# Patient Record
Sex: FEMALE | Race: WHITE | NOT HISPANIC OR LATINO | Employment: OTHER | URBAN - METROPOLITAN AREA
[De-identification: names, ages, dates, MRNs, and addresses within clinical notes are randomized per-mention and may not be internally consistent; named-entity substitution may affect disease eponyms.]

---

## 2017-02-03 ENCOUNTER — GENERIC CONVERSION - ENCOUNTER (OUTPATIENT)
Dept: OTHER | Facility: OTHER | Age: 62
End: 2017-02-03

## 2017-04-15 ENCOUNTER — TRANSCRIBE ORDERS (OUTPATIENT)
Dept: LAB | Facility: HOSPITAL | Age: 62
End: 2017-04-15

## 2017-04-15 ENCOUNTER — APPOINTMENT (OUTPATIENT)
Dept: LAB | Facility: HOSPITAL | Age: 62
End: 2017-04-15
Attending: INTERNAL MEDICINE
Payer: COMMERCIAL

## 2017-04-15 DIAGNOSIS — E78.5 OTHER AND UNSPECIFIED HYPERLIPIDEMIA: ICD-10-CM

## 2017-04-15 DIAGNOSIS — E78.5 OTHER AND UNSPECIFIED HYPERLIPIDEMIA: Primary | ICD-10-CM

## 2017-04-15 LAB
ALBUMIN SERPL BCP-MCNC: 3.6 G/DL (ref 3.5–5)
ALP SERPL-CCNC: 87 U/L (ref 46–116)
ALT SERPL W P-5'-P-CCNC: 26 U/L (ref 12–78)
ANION GAP SERPL CALCULATED.3IONS-SCNC: 9 MMOL/L (ref 4–13)
AST SERPL W P-5'-P-CCNC: 20 U/L (ref 5–45)
BASOPHILS # BLD AUTO: 0.03 THOUSANDS/ΜL (ref 0–0.1)
BASOPHILS NFR BLD AUTO: 0 % (ref 0–1)
BILIRUB SERPL-MCNC: 0.6 MG/DL (ref 0.2–1)
BUN SERPL-MCNC: 13 MG/DL (ref 5–25)
CALCIUM SERPL-MCNC: 9.1 MG/DL (ref 8.3–10.1)
CHLORIDE SERPL-SCNC: 104 MMOL/L (ref 100–108)
CHOLEST SERPL-MCNC: 252 MG/DL (ref 50–200)
CO2 SERPL-SCNC: 29 MMOL/L (ref 21–32)
CREAT SERPL-MCNC: 0.81 MG/DL (ref 0.6–1.3)
CREAT UR-MCNC: 77.6 MG/DL
CRP SERPL QL: 21.9 MG/L
EOSINOPHIL # BLD AUTO: 0.1 THOUSAND/ΜL (ref 0–0.61)
EOSINOPHIL NFR BLD AUTO: 1 % (ref 0–6)
ERYTHROCYTE [DISTWIDTH] IN BLOOD BY AUTOMATED COUNT: 13.2 % (ref 11.6–15.1)
EST. AVERAGE GLUCOSE BLD GHB EST-MCNC: 123 MG/DL
GFR SERPL CREATININE-BSD FRML MDRD: >60 ML/MIN/1.73SQ M
GLUCOSE P FAST SERPL-MCNC: 115 MG/DL (ref 65–99)
HBA1C MFR BLD: 5.9 % (ref 4.2–6.3)
HCT VFR BLD AUTO: 40.6 % (ref 34.8–46.1)
HDLC SERPL-MCNC: 69 MG/DL (ref 40–60)
HGB BLD-MCNC: 13.1 G/DL (ref 11.5–15.4)
LDLC SERPL CALC-MCNC: 163 MG/DL (ref 0–100)
LYMPHOCYTES # BLD AUTO: 2.41 THOUSANDS/ΜL (ref 0.6–4.47)
LYMPHOCYTES NFR BLD AUTO: 30 % (ref 14–44)
MCH RBC QN AUTO: 27.1 PG (ref 26.8–34.3)
MCHC RBC AUTO-ENTMCNC: 32.3 G/DL (ref 31.4–37.4)
MCV RBC AUTO: 84 FL (ref 82–98)
MICROALBUMIN UR-MCNC: <5 MG/L (ref 0–20)
MICROALBUMIN/CREAT 24H UR: <6 MG/G CREATININE (ref 0–30)
MONOCYTES # BLD AUTO: 0.44 THOUSAND/ΜL (ref 0.17–1.22)
MONOCYTES NFR BLD AUTO: 6 % (ref 4–12)
NEUTROPHILS # BLD AUTO: 4.99 THOUSANDS/ΜL (ref 1.85–7.62)
NEUTS SEG NFR BLD AUTO: 62 % (ref 43–75)
NRBC BLD AUTO-RTO: 0 /100 WBCS
PLATELET # BLD AUTO: 220 THOUSANDS/UL (ref 149–390)
PMV BLD AUTO: 11.2 FL (ref 8.9–12.7)
POTASSIUM SERPL-SCNC: 3.8 MMOL/L (ref 3.5–5.3)
PROT SERPL-MCNC: 7.2 G/DL (ref 6.4–8.2)
RBC # BLD AUTO: 4.83 MILLION/UL (ref 3.81–5.12)
SODIUM SERPL-SCNC: 142 MMOL/L (ref 136–145)
TRIGL SERPL-MCNC: 98 MG/DL
URATE SERPL-MCNC: 4.7 MG/DL (ref 2–6.8)
WBC # BLD AUTO: 8 THOUSAND/UL (ref 4.31–10.16)

## 2017-04-15 PROCEDURE — 85025 COMPLETE CBC W/AUTO DIFF WBC: CPT

## 2017-04-15 PROCEDURE — 36415 COLL VENOUS BLD VENIPUNCTURE: CPT

## 2017-04-15 PROCEDURE — 86140 C-REACTIVE PROTEIN: CPT

## 2017-04-15 PROCEDURE — 84550 ASSAY OF BLOOD/URIC ACID: CPT

## 2017-04-15 PROCEDURE — 82570 ASSAY OF URINE CREATININE: CPT | Performed by: INTERNAL MEDICINE

## 2017-04-15 PROCEDURE — 80053 COMPREHEN METABOLIC PANEL: CPT

## 2017-04-15 PROCEDURE — 80061 LIPID PANEL: CPT

## 2017-04-15 PROCEDURE — 83036 HEMOGLOBIN GLYCOSYLATED A1C: CPT

## 2017-04-15 PROCEDURE — 82043 UR ALBUMIN QUANTITATIVE: CPT | Performed by: INTERNAL MEDICINE

## 2017-04-18 ENCOUNTER — ALLSCRIPTS OFFICE VISIT (OUTPATIENT)
Dept: OTHER | Facility: OTHER | Age: 62
End: 2017-04-18

## 2017-07-03 ENCOUNTER — ALLSCRIPTS OFFICE VISIT (OUTPATIENT)
Dept: OTHER | Facility: OTHER | Age: 62
End: 2017-07-03

## 2017-12-13 ENCOUNTER — ALLSCRIPTS OFFICE VISIT (OUTPATIENT)
Dept: OTHER | Facility: OTHER | Age: 62
End: 2017-12-13

## 2017-12-13 LAB
BILIRUB UR QL STRIP: NORMAL
COLOR UR: NORMAL
GLUCOSE (HISTORICAL): NORMAL
HGB UR QL STRIP.AUTO: POSITIVE
KETONES UR STRIP-MCNC: NORMAL MG/DL
LEUKOCYTE ESTERASE UR QL STRIP: POSITIVE
NITRITE UR QL STRIP: NORMAL
PH UR STRIP.AUTO: 5 [PH]
SP GR UR STRIP.AUTO: 1
UROBILINOGEN UR QL STRIP.AUTO: 0.2
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
## 2017-12-16 ENCOUNTER — HOSPITAL ENCOUNTER (EMERGENCY)
Facility: HOSPITAL | Age: 62
Discharge: HOME | End: 2017-12-16
Attending: EMERGENCY MEDICINE | Admitting: EMERGENCY MEDICINE
Payer: COMMERCIAL

## 2017-12-16 VITALS
SYSTOLIC BLOOD PRESSURE: 141 MMHG | DIASTOLIC BLOOD PRESSURE: 74 MMHG | BODY MASS INDEX: 27 KG/M2 | TEMPERATURE: 100.3 F | HEIGHT: 61 IN | RESPIRATION RATE: 20 BRPM | WEIGHT: 143 LBS | OXYGEN SATURATION: 96 % | HEART RATE: 100 BPM

## 2017-12-16 DIAGNOSIS — N39.0 UTI (URINARY TRACT INFECTION): Primary | ICD-10-CM

## 2017-12-16 LAB
BACTERIA UR QL AUTO: ABNORMAL /HPF
BILIRUB UR QL STRIP: NEGATIVE
CLARITY UR: CLEAR
COLOR UR: YELLOW
GLUCOSE UR STRIP-MCNC: NEGATIVE MG/DL
HGB UR QL STRIP.AUTO: ABNORMAL
KETONES UR STRIP-MCNC: ABNORMAL MG/DL
LEUKOCYTE ESTERASE UR QL STRIP: NEGATIVE
NITRITE UR QL STRIP: NEGATIVE
NON-SQ EPI CELLS URNS QL MICRO: ABNORMAL /HPF
PH UR STRIP.AUTO: 6.5 [PH] (ref 4.5–8)
PROT UR STRIP-MCNC: NEGATIVE MG/DL
RBC #/AREA URNS AUTO: ABNORMAL /HPF
SP GR UR STRIP.AUTO: 1.02 (ref 1–1.03)
UROBILINOGEN UR QL STRIP.AUTO: 0.2 E.U./DL
WBC #/AREA URNS AUTO: ABNORMAL /HPF

## 2017-12-16 PROCEDURE — 99283 EMERGENCY DEPT VISIT LOW MDM: CPT

## 2017-12-16 PROCEDURE — 81001 URINALYSIS AUTO W/SCOPE: CPT | Performed by: EMERGENCY MEDICINE

## 2017-12-16 RX ORDER — CEPHALEXIN 500 MG/1
500 CAPSULE ORAL EVERY 6 HOURS SCHEDULED
Qty: 28 CAPSULE | Refills: 0 | Status: SHIPPED | OUTPATIENT
Start: 2017-12-16 | End: 2017-12-23

## 2017-12-16 RX ORDER — ONDANSETRON 4 MG/1
4 TABLET, ORALLY DISINTEGRATING ORAL ONCE
Status: COMPLETED | OUTPATIENT
Start: 2017-12-16 | End: 2017-12-16

## 2017-12-16 RX ORDER — CEPHALEXIN 250 MG/1
500 CAPSULE ORAL ONCE
Status: COMPLETED | OUTPATIENT
Start: 2017-12-16 | End: 2017-12-16

## 2017-12-16 RX ADMIN — ONDANSETRON 4 MG: 4 TABLET, ORALLY DISINTEGRATING ORAL at 17:20

## 2017-12-16 RX ADMIN — CEPHALEXIN 500 MG: 250 CAPSULE ORAL at 17:20

## 2017-12-16 NOTE — DISCHARGE INSTRUCTIONS

## 2017-12-16 NOTE — ED PROVIDER NOTES
History  Chief Complaint   Patient presents with    Possible UTI     Patient c/o urine frequency and urgency along with fever  57 y o  female presenting with dysuria  The patient states that she has recurrent UTIs and last night began to have burning with urination and frequent urination, associated with tactile fever and chills  Denies flank or back pain, hematuria, difficulty urinating  History provided by:  Patient, spouse and medical records      None       History reviewed  No pertinent past medical history  Past Surgical History:   Procedure Laterality Date    CERVICAL SPINE SURGERY      CHOLECYSTECTOMY         History reviewed  No pertinent family history  I have reviewed and agree with the history as documented  Social History   Substance Use Topics    Smoking status: Never Smoker    Smokeless tobacco: Never Used    Alcohol use No        Review of Systems   Constitutional: Positive for fever  HENT: Negative for congestion  Eyes: Negative for visual disturbance  Respiratory: Negative for shortness of breath  Cardiovascular: Negative for chest pain  Gastrointestinal: Negative for abdominal pain  Genitourinary: Positive for dysuria and frequency  Musculoskeletal: Negative for back pain  Neurological: Negative for numbness  Psychiatric/Behavioral: Negative for confusion  All other systems reviewed and are negative        Physical Exam  ED Triage Vitals   Temperature Pulse Respirations Blood Pressure SpO2   12/16/17 1629 12/16/17 1630 12/16/17 1630 12/16/17 1630 12/16/17 1630   100 3 °F (37 9 °C) (!) 109 20 168/84 98 %      Temp Source Heart Rate Source Patient Position - Orthostatic VS BP Location FiO2 (%)   12/16/17 1629 12/16/17 1630 12/16/17 1630 12/16/17 1630 --   Oral Monitor Sitting Right arm       Pain Score       --                  Orthostatic Vital Signs  Vitals:    12/16/17 1630   BP: 168/84   Pulse: (!) 109   Patient Position - Orthostatic VS: Sitting Physical Exam   Constitutional: She is oriented to person, place, and time  She appears well-developed and well-nourished  No distress  HENT:   Head: Normocephalic and atraumatic  Eyes: Pupils are equal, round, and reactive to light  Neck: Normal range of motion  Neck supple  Cardiovascular: Normal rate and regular rhythm  No murmur heard  Pulmonary/Chest: Effort normal and breath sounds normal  No respiratory distress  Abdominal: She exhibits no distension  There is no rebound and no guarding  No CVA tenderness, abdomen soft and non-tender throughout   Musculoskeletal: Normal range of motion  She exhibits no edema  Neurological: She is alert and oriented to person, place, and time  No cranial nerve deficit  Skin: Skin is warm and dry  Psychiatric: Her behavior is normal    Nursing note and vitals reviewed        ED Medications  Medications   ondansetron (ZOFRAN-ODT) dispersible tablet 4 mg (4 mg Oral Given 12/16/17 1720)   cephalexin (KEFLEX) capsule 500 mg (500 mg Oral Given 12/16/17 1720)       Diagnostic Studies  Results Reviewed     Procedure Component Value Units Date/Time    Urine Microscopic [[id number]]  (Abnormal) Collected:  12/16/17 1647    Lab Status:  Final result Specimen:  Urine from Urine, Clean Catch Updated:  12/16/17 1710     RBC, UA 2-4 (A) /hpf      WBC, UA None Seen /hpf      Epithelial Cells None Seen /hpf      Bacteria, UA None Seen /hpf     UA w Reflex to Microscopic w Reflex to Culture [[id number]]  (Abnormal) Collected:  12/16/17 1647    Lab Status:  Final result Specimen:  Urine from Urine, Clean Catch Updated:  12/16/17 1653     Color, UA Yellow     Clarity, UA Clear     Specific [place], UA 1 020     pH, UA 6 5     Leukocytes, UA Negative     Nitrite, UA Negative     Protein, UA Negative mg/dl      Glucose, UA Negative mg/dl      Ketones, UA 15 (1+) (A) mg/dl      Urobilinogen, UA 0 2 E U /dl      Bilirubin, UA Negative     Blood, UA Trace-Intact (A) No orders to display              Procedures  Procedures       Phone Contacts  ED Phone Contact    ED Course  ED Course        MDM  Number of Diagnoses or Management Options  UTI (urinary tract infection): new and does not require workup  Diagnosis management comments: Assessment:   57 y o  female presenting with dysuria/ frequency    Differential diagnosis includes, but is not limited to: UTI, pyelonephritis    Plan:  UA with reflex micro/ culture  Will treat empirically based on history/ symptoms  Patient instructed to follow up with PMD this week           Amount and/or Complexity of Data Reviewed  Clinical lab tests: ordered and reviewed  Independent visualization of images, tracings, or specimens: yes    Risk of Complications, Morbidity, and/or Mortality  Presenting problems: low  Diagnostic procedures: minimal  Management options: minimal    Patient Progress  Patient progress: improved    CritCare Time    Disposition  Final diagnoses:   UTI (urinary tract infection)     Time reflects when diagnosis was documented in both MDM as applicable and the Disposition within this note     Time User Action Codes Description Comment    12/16/2017  5:36 PM Ringhauser, Natalia Libman Add [N39 0] UTI (urinary tract infection)       ED Disposition     ED Disposition Condition Comment    Discharge  MERLINE STEVENS) [facility] discharge to home/self care      Condition at discharge: Good        Follow-up Information     Follow up With Specialties Details Why [address] [facility],  Internal Medicine Schedule an appointment as soon as possible for a visit in 2 days for re-evaluation and follow up [address]  [address] [facility]  [phone]          Patient's Medications   Discharge Prescriptions    CEPHALEXIN (KEFLEX) 500 MG CAPSULE    Take 1 capsule by mouth every 6 (six) hours for 7 days       Start Date: 12/16/2017End Date: 12/23/2017       Order Dose: 500 mg       Quantity: 28 capsule    Refills: 0 No discharge procedures on file      ED Provider  Electronically Signed by Nataliia Sanchez 2, DO  Resident  12/16/17 4438

## 2017-12-19 ENCOUNTER — GENERIC CONVERSION - ENCOUNTER (OUTPATIENT)
Dept: OTHER | Facility: OTHER | Age: 62
End: 2017-12-19

## 2018-01-12 ENCOUNTER — APPOINTMENT (OUTPATIENT)
Dept: LAB | Facility: HOSPITAL | Age: 63
End: 2018-01-12
Attending: INTERNAL MEDICINE
Payer: COMMERCIAL

## 2018-01-12 ENCOUNTER — TRANSCRIBE ORDERS (OUTPATIENT)
Dept: ADMINISTRATIVE | Facility: HOSPITAL | Age: 63
End: 2018-01-12

## 2018-01-12 DIAGNOSIS — R31.9 HEMATURIA: ICD-10-CM

## 2018-01-12 LAB
BACTERIA UR QL AUTO: ABNORMAL /HPF
BILIRUB UR QL STRIP: NEGATIVE
CLARITY UR: CLEAR
COLOR UR: YELLOW
GLUCOSE UR STRIP-MCNC: NEGATIVE MG/DL
HGB UR QL STRIP.AUTO: NEGATIVE
KETONES UR STRIP-MCNC: NEGATIVE MG/DL
LEUKOCYTE ESTERASE UR QL STRIP: ABNORMAL
NITRITE UR QL STRIP: NEGATIVE
NON-SQ EPI CELLS URNS QL MICRO: ABNORMAL /HPF
PH UR STRIP.AUTO: 6.5 [PH] (ref 4.5–8)
PROT UR STRIP-MCNC: NEGATIVE MG/DL
RBC #/AREA URNS AUTO: ABNORMAL /HPF
SP GR UR STRIP.AUTO: <=1.005 (ref 1–1.03)
UROBILINOGEN UR QL STRIP.AUTO: 0.2 E.U./DL
WBC #/AREA URNS AUTO: ABNORMAL /HPF

## 2018-01-12 PROCEDURE — 81001 URINALYSIS AUTO W/SCOPE: CPT

## 2018-01-13 DIAGNOSIS — R31.9 HEMATURIA: ICD-10-CM

## 2018-01-14 VITALS
HEIGHT: 62 IN | DIASTOLIC BLOOD PRESSURE: 80 MMHG | WEIGHT: 151.25 LBS | SYSTOLIC BLOOD PRESSURE: 140 MMHG | TEMPERATURE: 98.6 F | OXYGEN SATURATION: 97 % | HEART RATE: 96 BPM | BODY MASS INDEX: 27.83 KG/M2

## 2018-01-15 VITALS
TEMPERATURE: 98.6 F | WEIGHT: 153 LBS | HEART RATE: 82 BPM | HEIGHT: 62 IN | DIASTOLIC BLOOD PRESSURE: 80 MMHG | OXYGEN SATURATION: 98 % | SYSTOLIC BLOOD PRESSURE: 136 MMHG | BODY MASS INDEX: 28.16 KG/M2

## 2018-01-23 VITALS
WEIGHT: 143 LBS | DIASTOLIC BLOOD PRESSURE: 75 MMHG | BODY MASS INDEX: 27 KG/M2 | HEIGHT: 61 IN | TEMPERATURE: 98.7 F | SYSTOLIC BLOOD PRESSURE: 150 MMHG | OXYGEN SATURATION: 99 % | HEART RATE: 116 BPM

## 2018-01-23 NOTE — MISCELLANEOUS
Message  Message Free Text Note Form: pt had called on call 12/17/17 w c/o fever to 102 despite being treated for a UTI  Recent hx/o neck surgery  Advised she be seen in ER  Pt voiced understanding        Signatures   Electronically signed by : [facility]; Dec 19 2017  5:50PM EST                       (Author)

## 2018-04-17 ENCOUNTER — TRANSCRIBE ORDERS (OUTPATIENT)
Dept: ADMINISTRATIVE | Facility: HOSPITAL | Age: 63
End: 2018-04-17

## 2018-04-17 ENCOUNTER — APPOINTMENT (OUTPATIENT)
Dept: LAB | Facility: HOSPITAL | Age: 63
End: 2018-04-17
Attending: INTERNAL MEDICINE
Payer: MEDICARE

## 2018-04-17 DIAGNOSIS — E78.5 HYPERLIPIDEMIA, UNSPECIFIED HYPERLIPIDEMIA TYPE: ICD-10-CM

## 2018-04-17 DIAGNOSIS — E78.5 HYPERLIPIDEMIA, UNSPECIFIED HYPERLIPIDEMIA TYPE: Primary | ICD-10-CM

## 2018-04-17 LAB
ALBUMIN SERPL BCP-MCNC: 3.5 G/DL (ref 3.5–5)
ALP SERPL-CCNC: 77 U/L (ref 46–116)
ALT SERPL W P-5'-P-CCNC: 21 U/L (ref 12–78)
ANION GAP SERPL CALCULATED.3IONS-SCNC: 9 MMOL/L (ref 4–13)
AST SERPL W P-5'-P-CCNC: 17 U/L (ref 5–45)
BASOPHILS # BLD AUTO: 0.05 THOUSANDS/ΜL (ref 0–0.1)
BASOPHILS NFR BLD AUTO: 1 % (ref 0–1)
BILIRUB SERPL-MCNC: 0.5 MG/DL (ref 0.2–1)
BUN SERPL-MCNC: 16 MG/DL (ref 5–25)
CALCIUM SERPL-MCNC: 8.9 MG/DL (ref 8.3–10.1)
CHLORIDE SERPL-SCNC: 103 MMOL/L (ref 100–108)
CHOLEST SERPL-MCNC: 230 MG/DL (ref 50–200)
CO2 SERPL-SCNC: 28 MMOL/L (ref 21–32)
CREAT SERPL-MCNC: 0.93 MG/DL (ref 0.6–1.3)
CRP SERPL QL: 8 MG/L
EOSINOPHIL # BLD AUTO: 0.12 THOUSAND/ΜL (ref 0–0.61)
EOSINOPHIL NFR BLD AUTO: 2 % (ref 0–6)
ERYTHROCYTE [DISTWIDTH] IN BLOOD BY AUTOMATED COUNT: 13.1 % (ref 11.6–15.1)
EST. AVERAGE GLUCOSE BLD GHB EST-MCNC: 111 MG/DL
GFR SERPL CREATININE-BSD FRML MDRD: 65 ML/MIN/1.73SQ M
GLUCOSE P FAST SERPL-MCNC: 115 MG/DL (ref 65–99)
HBA1C MFR BLD: 5.5 % (ref 4.2–6.3)
HCT VFR BLD AUTO: 40.4 % (ref 34.8–46.1)
HDLC SERPL-MCNC: 65 MG/DL (ref 40–60)
HGB BLD-MCNC: 13.1 G/DL (ref 11.5–15.4)
LDLC SERPL CALC-MCNC: 148 MG/DL (ref 0–100)
LYMPHOCYTES # BLD AUTO: 2.98 THOUSANDS/ΜL (ref 0.6–4.47)
LYMPHOCYTES NFR BLD AUTO: 43 % (ref 14–44)
MCH RBC QN AUTO: 26.8 PG (ref 26.8–34.3)
MCHC RBC AUTO-ENTMCNC: 32.4 G/DL (ref 31.4–37.4)
MCV RBC AUTO: 83 FL (ref 82–98)
MONOCYTES # BLD AUTO: 0.44 THOUSAND/ΜL (ref 0.17–1.22)
MONOCYTES NFR BLD AUTO: 6 % (ref 4–12)
NEUTROPHILS # BLD AUTO: 3.41 THOUSANDS/ΜL (ref 1.85–7.62)
NEUTS SEG NFR BLD AUTO: 49 % (ref 43–75)
NONHDLC SERPL-MCNC: 165 MG/DL
NRBC BLD AUTO-RTO: 0 /100 WBCS
PLATELET # BLD AUTO: 190 THOUSANDS/UL (ref 149–390)
PMV BLD AUTO: 11 FL (ref 8.9–12.7)
POTASSIUM SERPL-SCNC: 3.7 MMOL/L (ref 3.5–5.3)
PROT SERPL-MCNC: 7.1 G/DL (ref 6.4–8.2)
RBC # BLD AUTO: 4.88 MILLION/UL (ref 3.81–5.12)
SODIUM SERPL-SCNC: 140 MMOL/L (ref 136–145)
TRIGL SERPL-MCNC: 83 MG/DL
URATE SERPL-MCNC: 5.3 MG/DL (ref 2–6.8)
WBC # BLD AUTO: 7.01 THOUSAND/UL (ref 4.31–10.16)

## 2018-04-17 PROCEDURE — 85025 COMPLETE CBC W/AUTO DIFF WBC: CPT

## 2018-04-17 PROCEDURE — 84550 ASSAY OF BLOOD/URIC ACID: CPT

## 2018-04-17 PROCEDURE — 36415 COLL VENOUS BLD VENIPUNCTURE: CPT

## 2018-04-17 PROCEDURE — 86140 C-REACTIVE PROTEIN: CPT

## 2018-04-17 PROCEDURE — 80061 LIPID PANEL: CPT

## 2018-04-17 PROCEDURE — 80053 COMPREHEN METABOLIC PANEL: CPT

## 2018-04-17 PROCEDURE — 83036 HEMOGLOBIN GLYCOSYLATED A1C: CPT

## 2018-05-01 ENCOUNTER — OFFICE VISIT (OUTPATIENT)
Dept: INTERNAL MEDICINE CLINIC | Facility: CLINIC | Age: 63
End: 2018-05-01
Payer: MEDICARE

## 2018-05-01 VITALS
DIASTOLIC BLOOD PRESSURE: 75 MMHG | HEIGHT: 61 IN | WEIGHT: 146.4 LBS | TEMPERATURE: 97.5 F | HEART RATE: 89 BPM | BODY MASS INDEX: 27.64 KG/M2 | OXYGEN SATURATION: 99 % | SYSTOLIC BLOOD PRESSURE: 138 MMHG

## 2018-05-01 DIAGNOSIS — Z11.59 NEED FOR HEPATITIS C SCREENING TEST: ICD-10-CM

## 2018-05-01 DIAGNOSIS — R73.01 IMPAIRED FASTING GLUCOSE: Primary | ICD-10-CM

## 2018-05-01 DIAGNOSIS — R73.03 PREDIABETES: ICD-10-CM

## 2018-05-01 DIAGNOSIS — Z23 NEED FOR SHINGLES VACCINE: ICD-10-CM

## 2018-05-01 DIAGNOSIS — R79.89 HIGH SERUM HIGH DENSITY LIPOPROTEIN (HDL): ICD-10-CM

## 2018-05-01 DIAGNOSIS — E78.89 ELEVATED HDL: ICD-10-CM

## 2018-05-01 DIAGNOSIS — E78.5 HYPERLIPIDEMIA, UNSPECIFIED HYPERLIPIDEMIA TYPE: ICD-10-CM

## 2018-05-01 DIAGNOSIS — R03.0 ELEVATED BLOOD PRESSURE READING: ICD-10-CM

## 2018-05-01 DIAGNOSIS — Z23 NEED FOR VACCINATION FOR PNEUMOCOCCUS: ICD-10-CM

## 2018-05-01 DIAGNOSIS — R31.9 HEMATURIA, UNSPECIFIED TYPE: ICD-10-CM

## 2018-05-01 PROBLEM — H69.81 EUSTACHIAN TUBE DYSFUNCTION, RIGHT: Status: ACTIVE | Noted: 2017-07-03

## 2018-05-01 PROBLEM — H69.91 EUSTACHIAN TUBE DYSFUNCTION, RIGHT: Status: ACTIVE | Noted: 2017-07-03

## 2018-05-01 PROBLEM — S12.100A C2 CERVICAL FRACTURE (HCC): Status: ACTIVE | Noted: 2017-09-08

## 2018-05-01 PROBLEM — S12.100A C2 CERVICAL FRACTURE (HCC): Status: RESOLVED | Noted: 2017-09-08 | Resolved: 2018-05-01

## 2018-05-01 PROBLEM — H81.10 BENIGN POSITIONAL VERTIGO: Status: ACTIVE | Noted: 2017-07-03

## 2018-05-01 PROCEDURE — 90670 PCV13 VACCINE IM: CPT

## 2018-05-01 PROCEDURE — G0009 ADMIN PNEUMOCOCCAL VACCINE: HCPCS

## 2018-05-01 PROCEDURE — 99213 OFFICE O/P EST LOW 20 MIN: CPT | Performed by: INTERNAL MEDICINE

## 2018-05-01 RX ORDER — ASPIRIN 81 MG/1
81 TABLET ORAL
COMMUNITY
End: 2020-01-21

## 2018-05-01 RX ORDER — MELATONIN: COMMUNITY

## 2018-05-01 RX ORDER — MECLIZINE HYDROCHLORIDE 25 MG/1
1 TABLET ORAL 3 TIMES DAILY PRN
COMMUNITY
Start: 2017-07-03 | End: 2019-03-11 | Stop reason: SDUPTHER

## 2018-05-01 RX ORDER — MAGNESIUM CARB/ALUMINUM HYDROX 105-160MG
1 TABLET,CHEWABLE ORAL 2 TIMES DAILY
COMMUNITY
Start: 2014-08-19

## 2018-05-01 RX ORDER — ACETAMINOPHEN 500 MG
2 TABLET ORAL 3 TIMES DAILY
COMMUNITY
Start: 2017-12-13

## 2018-05-01 NOTE — PROGRESS NOTES
Assessment/Plan:    1  Elevated blood sugar patient is much improved with diet lifestyle recheck in a year  2  Patient with high HDL LDL elevated diet and lifestyle stressed recheck in 1 year  3  History of cervical fractures doing well with physical therapy and massage therapy         Diagnoses and all orders for this visit:    Impaired fasting glucose    Elevated HDL  -     Multiple Vitamins-Minerals (ANTIOXIDANT FORMULA SG) capsule; Take 1 capsule by mouth daily  -     Lipid Panel with Direct LDL reflex; Future    Elevated blood pressure reading    Hyperlipidemia, unspecified hyperlipidemia type  -     Lipid Panel with Direct LDL reflex; Future    Prediabetes  -     CBC and differential; Future  -     Lipid Panel with Direct LDL reflex; Future  -     Comprehensive metabolic panel; Future  -     HEMOGLOBIN A1C W/ EAG ESTIMATION; Future    High serum high density lipoprotein (HDL)    Hematuria, unspecified type    Need for shingles vaccine  -     Zoster Vac Recomb Adjuvanted (SHINGRIX) 50 MCG SUSR; Inject 50 mcg into the shoulder, thigh, or buttocks once for 1 dose    Need for hepatitis C screening test  -     Hepatitis C antibody; Future    Need for vaccination for pneumococcus  -     PNEUMOCOCCAL CONJUGATE VACCINE 13-VALENT GREATER THAN 6 MONTHS; Future    Other orders  -     acetaminophen (TYLENOL) 500 mg tablet; Take 2 tablets by mouth 3 (three) times a day  -     aspirin (ECOTRIN LOW STRENGTH) 81 mg EC tablet; Take 81 mg by mouth  -     Cholecalciferol 2000 units CAPS; Take 1 capsule by mouth  -     Chromium 1 MG CAPS; Take by mouth  -     Omega-3 Fatty Acids (FISH OIL) 645 MG CAPS; Take by mouth  -     Glucosamine-Chondroitin 750-600 MG TABS; Take 1 tablet by mouth 2 (two) times a day  -     meclizine (ANTIVERT) 25 mg tablet; Take 1 tablet by mouth 3 (three) times a day as needed  -     Multiple Vitamins-Calcium (TGT DAILY MULTIVITAMIN WOMENS PO);  Take by mouth  -     cholecalciferol (VITAMIN D3) 1,000 units tablet; Take by mouth         Scheduled Meds:  Continuous Infusions:  No current facility-administered medications for this visit  PRN Meds:   Scheduled Meds:  Continuous Infusions:  No current facility-administered medications for this visit  Scheduled Meds:    Current Outpatient Prescriptions:     acetaminophen (TYLENOL) 500 mg tablet, Take 2 tablets by mouth 3 (three) times a day, Disp: , Rfl:     aspirin (ECOTRIN LOW STRENGTH) 81 mg EC tablet, Take 81 mg by mouth, Disp: , Rfl:     cholecalciferol (VITAMIN D3) 1,000 units tablet, Take by mouth, Disp: , Rfl:     Cholecalciferol 2000 units CAPS, Take 1 capsule by mouth, Disp: , Rfl:     Chromium 1 MG CAPS, Take by mouth, Disp: , Rfl:     Glucosamine-Chondroitin 750-600 MG TABS, Take 1 tablet by mouth 2 (two) times a day, Disp: , Rfl:     meclizine (ANTIVERT) 25 mg tablet, Take 1 tablet by mouth 3 (three) times a day as needed, Disp: , Rfl:     Multiple Vitamins-Calcium (TGT DAILY MULTIVITAMIN WOMENS PO), Take by mouth, Disp: , Rfl:     Omega-3 Fatty Acids (FISH OIL) 645 MG CAPS, Take by mouth, Disp: , Rfl:     Multiple Vitamins-Minerals (ANTIOXIDANT FORMULA SG) capsule, Take 1 capsule by mouth daily, Disp: , Rfl: 0    Zoster Vac Recomb Adjuvanted (SHINGRIX) 50 MCG SUSR, Inject 50 mcg into the shoulder, thigh, or buttocks once for 1 dose, Disp: 1 each, Rfl: 1      The patient was counseled regarding instructions for management, risk factor reductions, patient and family education,impressions, risks and benefits of treatment options, side effects of medications, importance of compliance with treatment  The treatment plan was reviewed with the patient/guardian and patient/guardian understands and agrees with the treatment plan  Subjective:      Patient ID: Mariano Perdomo is a 70 y o  female      HPI    The following portions of the patient's history were reviewed and updated as appropriate:   She has a past medical history of Gallbladder disease  ,   does not have any pertinent problems on file  ,   has a past surgical history that includes Cervical spine surgery and Cholecystectomy (1987)  ,  family history includes Hyperlipidemia in her father and mother; Osteoarthritis in her family; Other in her child, mother, and sister  ,   reports that she quit smoking about 37 years ago  She has never used smokeless tobacco  She reports that she does not drink alcohol or use drugs  ,  is allergic to pollen extract       Review of Systems   Constitutional: Negative for appetite change, chills, fatigue, fever and unexpected weight change  HENT: Negative for congestion, ear pain, facial swelling, hearing loss, mouth sores, nosebleeds, postnasal drip, rhinorrhea, sinus pain, sore throat, trouble swallowing and voice change  Eyes: Negative for pain, discharge, redness and visual disturbance  Respiratory: Negative for apnea, chest tightness, shortness of breath, wheezing and stridor  Cardiovascular: Negative for chest pain, palpitations and leg swelling  Gastrointestinal: Negative for abdominal distention, abdominal pain, blood in stool, constipation, diarrhea and vomiting  Endocrine: Negative for cold intolerance, heat intolerance, polydipsia, polyphagia and polyuria  Genitourinary: Negative for difficulty urinating, dysuria, flank pain, frequency, genital sores, hematuria and urgency  Musculoskeletal: Negative for arthralgias and back pain  Skin: Negative for rash and wound  Allergic/Immunologic: Negative for environmental allergies, food allergies and immunocompromised state  Neurological: Negative for dizziness, tremors, seizures, syncope, facial asymmetry, speech difficulty, weakness, light-headedness, numbness and headaches  Hematological: Negative for adenopathy  Does not bruise/bleed easily     Psychiatric/Behavioral: Negative for agitation, behavioral problems, dysphoric mood, hallucinations, self-injury, sleep disturbance and suicidal ideas  The patient is not hyperactive  Objective:     Physical Exam   Constitutional: She is oriented to person, place, and time  She appears well-developed  HENT:   Right Ear: External ear normal    Left Ear: External ear normal    Eyes: Right eye exhibits no discharge  Left eye exhibits no discharge  No scleral icterus  Neck: Carotid bruit is not present  No tracheal deviation present  No thyroid mass and no thyromegaly present  Cardiovascular: Normal rate, regular rhythm, normal heart sounds and intact distal pulses  Exam reveals no gallop and no friction rub  No murmur heard  Pulmonary/Chest: No respiratory distress  She has no wheezes  She has no rales  Musculoskeletal: She exhibits no edema  Lymphadenopathy:     She has no cervical adenopathy  Neurological: She is alert and oriented to person, place, and time  Coordination normal    Psychiatric: She has a normal mood and affect  Her behavior is normal  Judgment and thought content normal    Nursing note and vitals reviewed        Vitals:    05/01/18 0922 05/01/18 1000   BP:  138/75   BP Location:  Left arm   Patient Position:  Sitting   Pulse: 89    Temp: 97 5 °F (36 4 °C)    TempSrc: Tympanic    SpO2: 99%    Weight: 66 4 kg (146 lb 6 4 oz)    Height: 5' 1" (1 549 m)

## 2018-05-01 NOTE — PATIENT INSTRUCTIONS

## 2019-01-31 ENCOUNTER — OFFICE VISIT (OUTPATIENT)
Dept: INTERNAL MEDICINE CLINIC | Facility: CLINIC | Age: 64
End: 2019-01-31
Payer: MEDICARE

## 2019-01-31 VITALS
OXYGEN SATURATION: 98 % | TEMPERATURE: 98.1 F | RESPIRATION RATE: 18 BRPM | WEIGHT: 151 LBS | HEART RATE: 90 BPM | BODY MASS INDEX: 28.51 KG/M2 | HEIGHT: 61 IN | SYSTOLIC BLOOD PRESSURE: 118 MMHG | DIASTOLIC BLOOD PRESSURE: 80 MMHG

## 2019-01-31 DIAGNOSIS — N30.01 ACUTE CYSTITIS WITH HEMATURIA: Primary | ICD-10-CM

## 2019-01-31 LAB
SL AMB  POCT GLUCOSE, UA: 0
SL AMB LEUKOCYTE ESTERASE,UA: ABNORMAL
SL AMB POCT BILIRUBIN,UA: 0
SL AMB POCT BLOOD,UA: ABNORMAL
SL AMB POCT CLARITY,UA: ABNORMAL
SL AMB POCT COLOR,UA: YELLOW
SL AMB POCT KETONES,UA: 0
SL AMB POCT NITRITE,UA: 0
SL AMB POCT PH,UA: 6.5
SL AMB POCT SPECIFIC GRAVITY,UA: 1000
SL AMB POCT URINE PROTEIN: 0
SL AMB POCT UROBILINOGEN: 0.2

## 2019-01-31 PROCEDURE — 87186 SC STD MICRODIL/AGAR DIL: CPT | Performed by: NURSE PRACTITIONER

## 2019-01-31 PROCEDURE — 99213 OFFICE O/P EST LOW 20 MIN: CPT | Performed by: NURSE PRACTITIONER

## 2019-01-31 PROCEDURE — 81002 URINALYSIS NONAUTO W/O SCOPE: CPT | Performed by: NURSE PRACTITIONER

## 2019-01-31 PROCEDURE — 87077 CULTURE AEROBIC IDENTIFY: CPT | Performed by: NURSE PRACTITIONER

## 2019-01-31 PROCEDURE — 87086 URINE CULTURE/COLONY COUNT: CPT | Performed by: NURSE PRACTITIONER

## 2019-01-31 RX ORDER — NITROFURANTOIN 25; 75 MG/1; MG/1
100 CAPSULE ORAL 2 TIMES DAILY
Qty: 10 CAPSULE | Refills: 0 | Status: SHIPPED | OUTPATIENT
Start: 2019-01-31 | End: 2019-02-04

## 2019-01-31 NOTE — PROGRESS NOTES
Assessment/Plan:    UTI- (+) urine dip in office, will send for culture  Start antibiotic, drink plenty of fluids  Please call with any concerns  Follow up with me as needed and with Dr Radha Acevedo as scheduled in May, labs prior  Diagnoses and all orders for this visit:    Acute cystitis with hematuria  -     POCT urine dip  -     nitrofurantoin (MACROBID) 100 mg capsule; Take 1 capsule (100 mg total) by mouth 2 (two) times a day for 5 days  -     Urine culture; Future    The patient was counseled regarding instructions for management, risk factor reductions, patient and family education,impressions, risks and benefits of treatment options, side effects of medications, importance of compliance with treatment  The treatment plan was reviewed with the patient/guardian and patient/guardian understands and agrees with the treatment plan          Current Outpatient Prescriptions:     acetaminophen (TYLENOL) 500 mg tablet, Take 2 tablets by mouth 3 (three) times a day, Disp: , Rfl:     aspirin (ECOTRIN LOW STRENGTH) 81 mg EC tablet, Take 81 mg by mouth, Disp: , Rfl:     cholecalciferol (VITAMIN D3) 1,000 units tablet, Take by mouth, Disp: , Rfl:     Cholecalciferol 2000 units CAPS, Take 1 capsule by mouth, Disp: , Rfl:     Chromium 1 MG CAPS, Take by mouth, Disp: , Rfl:     Glucosamine-Chondroitin 750-600 MG TABS, Take 1 tablet by mouth 2 (two) times a day, Disp: , Rfl:     meclizine (ANTIVERT) 25 mg tablet, Take 1 tablet by mouth 3 (three) times a day as needed, Disp: , Rfl:     Multiple Vitamins-Calcium (TGT DAILY MULTIVITAMIN WOMENS PO), Take by mouth, Disp: , Rfl:     Multiple Vitamins-Minerals (ANTIOXIDANT FORMULA SG) capsule, Take 1 capsule by mouth daily, Disp: , Rfl: 0    nitrofurantoin (MACROBID) 100 mg capsule, Take 1 capsule (100 mg total) by mouth 2 (two) times a day for 5 days, Disp: 10 capsule, Rfl: 0    Omega-3 Fatty Acids (FISH OIL) 645 MG CAPS, Take by mouth, Disp: , Rfl:     Subjective: Patient ID: Alma Delia Avila is a 70 y o  female  Burning with urination stated yesterday  The following portions of the patient's history were reviewed and updated as appropriate:   She has a past medical history of Gallbladder disease  ,   does not have any pertinent problems on file  ,   has a past surgical history that includes Cervical spine surgery and Cholecystectomy (1987)  ,  family history includes Hyperlipidemia in her father and mother; Osteoarthritis in her family; Other in her child, mother, and sister  ,   reports that she quit smoking about 37 years ago  She has never used smokeless tobacco  She reports that she does not drink alcohol or use drugs  ,  is allergic to other and pollen extract       Review of Systems   Constitutional: Negative  Respiratory: Negative  Cardiovascular: Negative  Genitourinary: Positive for dysuria  Musculoskeletal: Negative  Psychiatric/Behavioral: Negative  Objective:  /80   Pulse 90   Temp 98 1 °F (36 7 °C)   Resp 18   Ht 5' 1" (1 549 m)   Wt 68 5 kg (151 lb)   SpO2 98%   BMI 28 53 kg/m²     Lab Review  No visits with results within 2 Month(s) from this visit     Latest known visit with results is:   Appointment on 04/17/2018   Component Date Value    WBC 04/17/2018 7 01     RBC 04/17/2018 4 88     Hemoglobin 04/17/2018 13 1     Hematocrit 04/17/2018 40 4     MCV 04/17/2018 83     MCH 04/17/2018 26 8     MCHC 04/17/2018 32 4     RDW 04/17/2018 13 1     MPV 04/17/2018 11 0     Platelets 37/24/6483 190     nRBC 04/17/2018 0     Neutrophils Relative 04/17/2018 49     Lymphocytes Relative 04/17/2018 43     Monocytes Relative 04/17/2018 6     Eosinophils Relative 04/17/2018 2     Basophils Relative 04/17/2018 1     Neutrophils Absolute 04/17/2018 3 41     Lymphocytes Absolute 04/17/2018 2 98     Monocytes Absolute 04/17/2018 0 44     Eosinophils Absolute 04/17/2018 0 12     Basophils Absolute 04/17/2018 0 05     Sodium 04/17/2018 140     Potassium 04/17/2018 3 7     Chloride 04/17/2018 103     CO2 04/17/2018 28     ANION GAP 04/17/2018 9     BUN 04/17/2018 16     Creatinine 04/17/2018 0 93     Glucose, Fasting 04/17/2018 115*    Calcium 04/17/2018 8 9     AST 04/17/2018 17     ALT 04/17/2018 21     Alkaline Phosphatase 04/17/2018 77     Total Protein 04/17/2018 7 1     Albumin 04/17/2018 3 5     Total Bilirubin 04/17/2018 0 50     eGFR 04/17/2018 65     CRP 04/17/2018 8 0*    Cholesterol 04/17/2018 230*    Triglycerides 04/17/2018 83     HDL, Direct 04/17/2018 65*    LDL Calculated 04/17/2018 148*    Non-HDL-Chol (CHOL-HDL) 04/17/2018 165     Uric Acid 04/17/2018 5 3     Hemoglobin A1C 04/17/2018 5 5     EAG 04/17/2018 111       Imaging: No results found  Physical Exam   Constitutional: She is oriented to person, place, and time  She appears well-developed and well-nourished  Cardiovascular: Normal rate, regular rhythm, normal heart sounds and intact distal pulses  Pulmonary/Chest: Effort normal and breath sounds normal    Abdominal: Soft  Bowel sounds are normal  She exhibits distension  There is tenderness  Musculoskeletal: Normal range of motion  Neurological: She is alert and oriented to person, place, and time  She has normal reflexes  Psychiatric: She has a normal mood and affect   Her behavior is normal  Judgment and thought content normal

## 2019-02-01 ENCOUNTER — TELEPHONE (OUTPATIENT)
Dept: INTERNAL MEDICINE CLINIC | Facility: CLINIC | Age: 64
End: 2019-02-01

## 2019-02-01 NOTE — TELEPHONE ENCOUNTER
Pt on macrobid for uti since yesterday (also took a tylenol last night)     This morn she is Feverish (no exact result),  achey ,   Did vommit this morning also        Merlin wondering if somethin else going on?

## 2019-02-03 LAB — BACTERIA UR CULT: ABNORMAL

## 2019-02-04 ENCOUNTER — OFFICE VISIT (OUTPATIENT)
Dept: INTERNAL MEDICINE CLINIC | Facility: CLINIC | Age: 64
End: 2019-02-04
Payer: MEDICARE

## 2019-02-04 ENCOUNTER — HOSPITAL ENCOUNTER (OUTPATIENT)
Dept: RADIOLOGY | Facility: HOSPITAL | Age: 64
Discharge: HOME | End: 2019-02-04
Payer: MEDICARE

## 2019-02-04 ENCOUNTER — TELEPHONE (OUTPATIENT)
Dept: INTERNAL MEDICINE CLINIC | Facility: CLINIC | Age: 64
End: 2019-02-04

## 2019-02-04 VITALS
TEMPERATURE: 101.3 F | SYSTOLIC BLOOD PRESSURE: 121 MMHG | HEART RATE: 100 BPM | BODY MASS INDEX: 28.55 KG/M2 | RESPIRATION RATE: 18 BRPM | DIASTOLIC BLOOD PRESSURE: 74 MMHG | WEIGHT: 151.2 LBS | OXYGEN SATURATION: 97 % | HEIGHT: 61 IN

## 2019-02-04 DIAGNOSIS — R05.9 COUGH: ICD-10-CM

## 2019-02-04 DIAGNOSIS — R50.9 FEVER IN ADULT: ICD-10-CM

## 2019-02-04 DIAGNOSIS — R21 RASH: ICD-10-CM

## 2019-02-04 DIAGNOSIS — N30.01 ACUTE CYSTITIS WITH HEMATURIA: Primary | ICD-10-CM

## 2019-02-04 DIAGNOSIS — N30.01 ACUTE CYSTITIS WITH HEMATURIA: ICD-10-CM

## 2019-02-04 LAB
FLUAV AG SPEC QL: NOT DETECTED
FLUBV AG SPEC QL: NOT DETECTED
RSV B RNA SPEC QL NAA+PROBE: NOT DETECTED

## 2019-02-04 PROCEDURE — 99214 OFFICE O/P EST MOD 30 MIN: CPT | Performed by: NURSE PRACTITIONER

## 2019-02-04 PROCEDURE — 71046 X-RAY EXAM CHEST 2 VIEWS: CPT

## 2019-02-04 PROCEDURE — 87631 RESP VIRUS 3-5 TARGETS: CPT | Performed by: NURSE PRACTITIONER

## 2019-02-04 RX ORDER — AMOXICILLIN AND CLAVULANATE POTASSIUM 875; 125 MG/1; MG/1
1 TABLET, FILM COATED ORAL EVERY 12 HOURS SCHEDULED
Qty: 20 TABLET | Refills: 0 | Status: SHIPPED | OUTPATIENT
Start: 2019-02-04 | End: 2019-02-14

## 2019-02-04 RX ORDER — FLUCONAZOLE 150 MG/1
TABLET ORAL
Qty: 2 TABLET | Refills: 0 | Status: SHIPPED | OUTPATIENT
Start: 2019-02-04 | End: 2019-02-07

## 2019-02-04 RX ORDER — PREDNISONE 10 MG/1
TABLET ORAL
Qty: 30 TABLET | Refills: 0 | Status: SHIPPED | OUTPATIENT
Start: 2019-02-04 | End: 2019-04-08 | Stop reason: ALTCHOICE

## 2019-02-04 RX ORDER — LORATADINE 10 MG/1
10 TABLET ORAL DAILY
Qty: 30 TABLET | Refills: 0 | Status: SHIPPED | OUTPATIENT
Start: 2019-02-04 | End: 2019-03-03 | Stop reason: SDUPTHER

## 2019-02-04 NOTE — PATIENT INSTRUCTIONS
1  Most likely febrile UTI- will change abx to generic Augmentin urine cx (+) e  coli  per sensitivity  Jase Ballantine is now listed as an allergy in your chart  Fluconazole given in case vaginal yeast infection sx develop  Continue your probiotic  2  Cough- (+) fever, will rule out influenza via nasal swab and to do start CXR now  We will call you with results  Please call the office if you take a turn for the worse  Otherwise please call on Friday with an update on how you are feeling

## 2019-02-04 NOTE — PROGRESS NOTES
Assessment/Plan:    1  Most likely febrile UTI- will change abx to generic Augmentin urine cx (+) e  coli  per sensitivity  Rosa Benites is now listed as an allergy in your chart  Fluconazole given in case vaginal yeast infection sx develop  Continue your probiotic  2  Cough- (+) fever, will rule out influenza via nasal swab and to do start CXR now  We will call you with results  Please call the office if you take a turn for the worse  Otherwise please call on Friday with an update on how you are feeling  Diagnoses and all orders for this visit:    Acute cystitis with hematuria  -     amoxicillin-clavulanate (AUGMENTIN) 875-125 mg per tablet; Take 1 tablet by mouth every 12 (twelve) hours for 10 days  -     fluconazole (DIFLUCAN) 150 mg tablet; Take one tab po now, may repeat dose x 1 in 72 hrs if sx persist   -     loratadine (CLARITIN) 10 mg tablet; Take 1 tablet (10 mg total) by mouth daily  -     predniSONE 10 mg tablet; Take 4 tabs by mouth x 3 days, then 3 tabs by mouth x 3 days, then 2 tabs by mouth x 3 days, then 1 tab by mouth x 3 days  -     INFLUENZA A/B AND RSV, PCR; Future  -     XR chest pa & lateral; Future  -     INFLUENZA A/B AND RSV, PCR    Cough  -     amoxicillin-clavulanate (AUGMENTIN) 875-125 mg per tablet; Take 1 tablet by mouth every 12 (twelve) hours for 10 days  -     fluconazole (DIFLUCAN) 150 mg tablet; Take one tab po now, may repeat dose x 1 in 72 hrs if sx persist   -     loratadine (CLARITIN) 10 mg tablet; Take 1 tablet (10 mg total) by mouth daily  -     predniSONE 10 mg tablet; Take 4 tabs by mouth x 3 days, then 3 tabs by mouth x 3 days, then 2 tabs by mouth x 3 days, then 1 tab by mouth x 3 days  -     INFLUENZA A/B AND RSV, PCR; Future  -     XR chest pa & lateral; Future  -     INFLUENZA A/B AND RSV, PCR    Fever in adult  -     amoxicillin-clavulanate (AUGMENTIN) 875-125 mg per tablet;  Take 1 tablet by mouth every 12 (twelve) hours for 10 days  -     fluconazole (DIFLUCAN) 150 mg tablet; Take one tab po now, may repeat dose x 1 in 72 hrs if sx persist   -     loratadine (CLARITIN) 10 mg tablet; Take 1 tablet (10 mg total) by mouth daily  -     predniSONE 10 mg tablet; Take 4 tabs by mouth x 3 days, then 3 tabs by mouth x 3 days, then 2 tabs by mouth x 3 days, then 1 tab by mouth x 3 days  -     INFLUENZA A/B AND RSV, PCR; Future  -     XR chest pa & lateral; Future  -     INFLUENZA A/B AND RSV, PCR    Rash  -     amoxicillin-clavulanate (AUGMENTIN) 875-125 mg per tablet; Take 1 tablet by mouth every 12 (twelve) hours for 10 days  -     fluconazole (DIFLUCAN) 150 mg tablet; Take one tab po now, may repeat dose x 1 in 72 hrs if sx persist   -     loratadine (CLARITIN) 10 mg tablet; Take 1 tablet (10 mg total) by mouth daily  -     predniSONE 10 mg tablet; Take 4 tabs by mouth x 3 days, then 3 tabs by mouth x 3 days, then 2 tabs by mouth x 3 days, then 1 tab by mouth x 3 days  -     INFLUENZA A/B AND RSV, PCR; Future  -     XR chest pa & lateral; Future  -     INFLUENZA A/B AND RSV, PCR        The patient was counseled regarding instructions for management, risk factor reductions, patient and family education,impressions, risks and benefits of treatment options, side effects of medications, importance of compliance with treatment  The treatment plan was reviewed with the patient/guardian and patient/guardian understands and agrees with the treatment plan          Current Outpatient Prescriptions:     acetaminophen (TYLENOL) 500 mg tablet, Take 2 tablets by mouth 3 (three) times a day, Disp: , Rfl:     aspirin (ECOTRIN LOW STRENGTH) 81 mg EC tablet, Take 81 mg by mouth, Disp: , Rfl:     cholecalciferol (VITAMIN D3) 1,000 units tablet, Take by mouth, Disp: , Rfl:     Cholecalciferol 2000 units CAPS, Take 1 capsule by mouth, Disp: , Rfl:     Chromium 1 MG CAPS, Take by mouth, Disp: , Rfl:     Glucosamine-Chondroitin 750-600 MG TABS, Take 1 tablet by mouth 2 (two) times a day, Disp: , Rfl:     meclizine (ANTIVERT) 25 mg tablet, Take 1 tablet by mouth 3 (three) times a day as needed, Disp: , Rfl:     Multiple Vitamins-Calcium (TGT DAILY MULTIVITAMIN WOMENS PO), Take by mouth, Disp: , Rfl:     Omega-3 Fatty Acids (FISH OIL) 645 MG CAPS, Take by mouth, Disp: , Rfl:     amoxicillin-clavulanate (AUGMENTIN) 875-125 mg per tablet, Take 1 tablet by mouth every 12 (twelve) hours for 10 days, Disp: 20 tablet, Rfl: 0    fluconazole (DIFLUCAN) 150 mg tablet, Take one tab po now, may repeat dose x 1 in 72 hrs if sx persist , Disp: 2 tablet, Rfl: 0    loratadine (CLARITIN) 10 mg tablet, Take 1 tablet (10 mg total) by mouth daily, Disp: 30 tablet, Rfl: 0    predniSONE 10 mg tablet, Take 4 tabs by mouth x 3 days, then 3 tabs by mouth x 3 days, then 2 tabs by mouth x 3 days, then 1 tab by mouth x 3 days, Disp: 30 tablet, Rfl: 0    Subjective:      Patient ID: Som Rich is a 70 y o  female  Recently treated with Macrobid for UTI, still with dysuria, developed rash on trunk and legs yesterday  Started with fever two days after starting abx  Has fever today, also with cough, pain in scapula area  Had flu shot        The following portions of the patient's history were reviewed and updated as appropriate:   She has a past medical history of Gallbladder disease  ,   does not have any pertinent problems on file  ,   has a past surgical history that includes Cervical spine surgery and Cholecystectomy (1987)  ,  family history includes Hyperlipidemia in her father and mother; Osteoarthritis in her family; Other in her child, mother, and sister  ,   reports that she quit smoking about 37 years ago  She has never used smokeless tobacco  She reports that she does not drink alcohol or use drugs  ,  is allergic to other; pollen extract; and macrobid [nitrofurantoin]       Review of Systems   Constitutional: Positive for fever  Respiratory: Positive for cough  Cardiovascular: Negative      Genitourinary: Positive for dysuria  Musculoskeletal: Negative  Psychiatric/Behavioral: Negative  Objective:  /74 (BP Location: Left arm, Patient Position: Sitting, Cuff Size: Standard)   Pulse 100   Temp (!) 101 3 °F (38 5 °C) (Tympanic)   Resp 18   Ht 5' 1" (1 549 m)   Wt 68 6 kg (151 lb 3 2 oz)   SpO2 97%   BMI 28 57 kg/m²     Lab Review  Office Visit on 01/31/2019   Component Date Value    LEUKOCYTE ESTERASE,UA 01/31/2019 pos     NITRITE,UA 01/31/2019 0     SL AMB POCT UROBILINOGEN 01/31/2019 0 2     POCT URINE PROTEIN 01/31/2019 0      PH,UA 01/31/2019 6 5     BLOOD,UA 01/31/2019 small     SPECIFIC GRAVITY,UA 01/31/2019 1000     KETONES,UA 01/31/2019 0     BILIRUBIN,UA 01/31/2019 0     GLUCOSE, UA 01/31/2019 0      COLOR,UA 01/31/2019 yellow     CLARITY,UA 01/31/2019 sark     Urine Culture 01/31/2019 >100,000 cfu/ml Escherichia coli*        Imaging: No results found  Physical Exam   Constitutional: She is oriented to person, place, and time  She appears well-developed and well-nourished  Cardiovascular: Normal rate, regular rhythm, normal heart sounds and intact distal pulses  Pulmonary/Chest: Effort normal and breath sounds normal    (+) cough   Abdominal: Soft  There is no tenderness  No CVA tenderness   Musculoskeletal: Normal range of motion  Neurological: She is alert and oriented to person, place, and time  She has normal reflexes  Psychiatric: She has a normal mood and affect   Her behavior is normal  Judgment and thought content normal

## 2019-02-04 NOTE — TELEPHONE ENCOUNTER
----- Message from [facility], [address] sent at 2/4/2019  2:11 PM EST -----  Please call  CXR clear   thanks

## 2019-02-08 ENCOUNTER — TELEPHONE (OUTPATIENT)
Dept: INTERNAL MEDICINE CLINIC | Facility: CLINIC | Age: 64
End: 2019-02-08

## 2019-03-03 DIAGNOSIS — R50.9 FEVER IN ADULT: ICD-10-CM

## 2019-03-03 DIAGNOSIS — N30.01 ACUTE CYSTITIS WITH HEMATURIA: ICD-10-CM

## 2019-03-03 DIAGNOSIS — R05.9 COUGH: ICD-10-CM

## 2019-03-03 DIAGNOSIS — R21 RASH: ICD-10-CM

## 2019-03-04 RX ORDER — LORATADINE 10 MG/1
TABLET ORAL
Qty: 30 TABLET | Refills: 0 | Status: SHIPPED | OUTPATIENT
Start: 2019-03-04 | End: 2020-03-10

## 2019-03-11 ENCOUNTER — TELEPHONE (OUTPATIENT)
Dept: INTERNAL MEDICINE CLINIC | Facility: CLINIC | Age: 64
End: 2019-03-11

## 2019-03-11 DIAGNOSIS — R42 VERTIGO: Primary | ICD-10-CM

## 2019-03-11 RX ORDER — MECLIZINE HYDROCHLORIDE 25 MG/1
25 TABLET ORAL 3 TIMES DAILY PRN
Qty: 30 TABLET | Refills: 1 | Status: SHIPPED | OUTPATIENT
Start: 2019-03-11 | End: 2020-09-21 | Stop reason: ALTCHOICE

## 2019-03-11 RX ORDER — MECLIZINE HYDROCHLORIDE 25 MG/1
25 TABLET ORAL EVERY 8 HOURS PRN
Qty: 30 TABLET | Refills: 1 | Status: CANCELLED | OUTPATIENT
Start: 2019-03-11

## 2019-03-11 NOTE — TELEPHONE ENCOUNTER
Asking for a just in case script for meclazine ( its part of her med list)    shes under a lot of stress with her  having a brain tumor removed  She had a spell this morning but recovered using dramamine       [facility] [place]

## 2019-04-05 ENCOUNTER — TELEPHONE (OUTPATIENT)
Dept: INTERNAL MEDICINE CLINIC | Facility: CLINIC | Age: 64
End: 2019-04-05

## 2019-04-08 ENCOUNTER — OFFICE VISIT (OUTPATIENT)
Dept: INTERNAL MEDICINE CLINIC | Facility: CLINIC | Age: 64
End: 2019-04-08
Payer: MEDICARE

## 2019-04-08 VITALS
OXYGEN SATURATION: 98 % | DIASTOLIC BLOOD PRESSURE: 82 MMHG | RESPIRATION RATE: 18 BRPM | TEMPERATURE: 97.6 F | SYSTOLIC BLOOD PRESSURE: 138 MMHG | WEIGHT: 146.6 LBS | HEIGHT: 61 IN | HEART RATE: 97 BPM | BODY MASS INDEX: 27.68 KG/M2

## 2019-04-08 DIAGNOSIS — F41.9 ANXIETY: ICD-10-CM

## 2019-04-08 DIAGNOSIS — H81.13 BENIGN PAROXYSMAL POSITIONAL VERTIGO DUE TO BILATERAL VESTIBULAR DISORDER: Primary | ICD-10-CM

## 2019-04-08 DIAGNOSIS — R03.0 ELEVATED BLOOD PRESSURE READING: ICD-10-CM

## 2019-04-08 PROBLEM — H69.91 EUSTACHIAN TUBE DYSFUNCTION, RIGHT: Status: RESOLVED | Noted: 2017-07-03 | Resolved: 2019-04-08

## 2019-04-08 PROBLEM — R05.9 COUGH: Status: RESOLVED | Noted: 2019-02-04 | Resolved: 2019-04-08

## 2019-04-08 PROBLEM — R21 RASH: Status: RESOLVED | Noted: 2019-02-04 | Resolved: 2019-04-08

## 2019-04-08 PROBLEM — N30.01 ACUTE CYSTITIS WITH HEMATURIA: Status: RESOLVED | Noted: 2019-01-31 | Resolved: 2019-04-08

## 2019-04-08 PROBLEM — R50.9 FEVER IN ADULT: Status: RESOLVED | Noted: 2019-02-04 | Resolved: 2019-04-08

## 2019-04-08 PROBLEM — H69.81 EUSTACHIAN TUBE DYSFUNCTION, RIGHT: Status: RESOLVED | Noted: 2017-07-03 | Resolved: 2019-04-08

## 2019-04-08 PROCEDURE — 99214 OFFICE O/P EST MOD 30 MIN: CPT | Performed by: NURSE PRACTITIONER

## 2019-04-30 DIAGNOSIS — Z23 NEED FOR MENINGOCOCCAL VACCINATION: Primary | ICD-10-CM

## 2019-05-01 ENCOUNTER — CLINICAL SUPPORT (OUTPATIENT)
Dept: FAMILY MEDICINE CLINIC | Facility: CLINIC | Age: 64
End: 2019-05-01
Payer: MEDICARE

## 2019-05-01 DIAGNOSIS — Z23 NEED FOR VACCINATION: Primary | ICD-10-CM

## 2019-05-01 PROCEDURE — 90471 IMMUNIZATION ADMIN: CPT

## 2019-05-01 PROCEDURE — 90734 MENACWYD/MENACWYCRM VACC IM: CPT

## 2019-05-06 ENCOUNTER — OFFICE VISIT (OUTPATIENT)
Dept: BEHAVIORAL/MENTAL HEALTH CLINIC | Facility: CLINIC | Age: 64
End: 2019-05-06
Payer: MEDICARE

## 2019-05-06 DIAGNOSIS — F41.9 ANXIETY: Primary | ICD-10-CM

## 2019-05-06 PROCEDURE — 90834 PSYTX W PT 45 MINUTES: CPT | Performed by: SOCIAL WORKER

## 2019-05-08 ENCOUNTER — APPOINTMENT (OUTPATIENT)
Dept: LAB | Facility: HOSPITAL | Age: 64
End: 2019-05-08
Attending: INTERNAL MEDICINE
Payer: MEDICARE

## 2019-05-08 DIAGNOSIS — Z11.59 NEED FOR HEPATITIS C SCREENING TEST: ICD-10-CM

## 2019-05-08 DIAGNOSIS — E78.5 HYPERLIPIDEMIA, UNSPECIFIED HYPERLIPIDEMIA TYPE: ICD-10-CM

## 2019-05-08 DIAGNOSIS — R73.03 PREDIABETES: ICD-10-CM

## 2019-05-08 DIAGNOSIS — E78.89 ELEVATED HDL: ICD-10-CM

## 2019-05-08 LAB
ALBUMIN SERPL BCP-MCNC: 3.6 G/DL (ref 3.5–5)
ALP SERPL-CCNC: 78 U/L (ref 46–116)
ALT SERPL W P-5'-P-CCNC: 26 U/L (ref 12–78)
ANION GAP SERPL CALCULATED.3IONS-SCNC: 10 MMOL/L (ref 4–13)
AST SERPL W P-5'-P-CCNC: 18 U/L (ref 5–45)
BASOPHILS # BLD AUTO: 0.04 THOUSANDS/ΜL (ref 0–0.1)
BASOPHILS NFR BLD AUTO: 1 % (ref 0–1)
BILIRUB SERPL-MCNC: 0.6 MG/DL (ref 0.2–1)
BUN SERPL-MCNC: 12 MG/DL (ref 5–25)
CALCIUM SERPL-MCNC: 9.2 MG/DL (ref 8.3–10.1)
CHLORIDE SERPL-SCNC: 105 MMOL/L (ref 100–108)
CHOLEST SERPL-MCNC: 260 MG/DL (ref 50–200)
CO2 SERPL-SCNC: 28 MMOL/L (ref 21–32)
CREAT SERPL-MCNC: 0.77 MG/DL (ref 0.6–1.3)
EOSINOPHIL # BLD AUTO: 0.16 THOUSAND/ΜL (ref 0–0.61)
EOSINOPHIL NFR BLD AUTO: 2 % (ref 0–6)
ERYTHROCYTE [DISTWIDTH] IN BLOOD BY AUTOMATED COUNT: 13.6 % (ref 11.6–15.1)
EST. AVERAGE GLUCOSE BLD GHB EST-MCNC: 117 MG/DL
GFR SERPL CREATININE-BSD FRML MDRD: 81 ML/MIN/1.73SQ M
GLUCOSE P FAST SERPL-MCNC: 109 MG/DL (ref 65–99)
HBA1C MFR BLD: 5.7 % (ref 4.2–6.3)
HCT VFR BLD AUTO: 41.6 % (ref 34.8–46.1)
HCV AB SER QL: NORMAL
HDLC SERPL-MCNC: 63 MG/DL (ref 40–60)
HGB BLD-MCNC: 13.3 G/DL (ref 11.5–15.4)
IMM GRANULOCYTES # BLD AUTO: 0.02 THOUSAND/UL (ref 0–0.2)
IMM GRANULOCYTES NFR BLD AUTO: 0 % (ref 0–2)
LDLC SERPL CALC-MCNC: 178 MG/DL (ref 0–100)
LYMPHOCYTES # BLD AUTO: 2.94 THOUSANDS/ΜL (ref 0.6–4.47)
LYMPHOCYTES NFR BLD AUTO: 43 % (ref 14–44)
MCH RBC QN AUTO: 27.7 PG (ref 26.8–34.3)
MCHC RBC AUTO-ENTMCNC: 32 G/DL (ref 31.4–37.4)
MCV RBC AUTO: 87 FL (ref 82–98)
MONOCYTES # BLD AUTO: 0.44 THOUSAND/ΜL (ref 0.17–1.22)
MONOCYTES NFR BLD AUTO: 6 % (ref 4–12)
NEUTROPHILS # BLD AUTO: 3.28 THOUSANDS/ΜL (ref 1.85–7.62)
NEUTS SEG NFR BLD AUTO: 48 % (ref 43–75)
NRBC BLD AUTO-RTO: 0 /100 WBCS
PLATELET # BLD AUTO: 218 THOUSANDS/UL (ref 149–390)
PMV BLD AUTO: 11.4 FL (ref 8.9–12.7)
POTASSIUM SERPL-SCNC: 3.6 MMOL/L (ref 3.5–5.3)
PROT SERPL-MCNC: 7.2 G/DL (ref 6.4–8.2)
RBC # BLD AUTO: 4.81 MILLION/UL (ref 3.81–5.12)
SODIUM SERPL-SCNC: 143 MMOL/L (ref 136–145)
TRIGL SERPL-MCNC: 94 MG/DL
WBC # BLD AUTO: 6.88 THOUSAND/UL (ref 4.31–10.16)

## 2019-05-08 PROCEDURE — 86803 HEPATITIS C AB TEST: CPT

## 2019-05-08 PROCEDURE — 36415 COLL VENOUS BLD VENIPUNCTURE: CPT

## 2019-05-08 PROCEDURE — 80061 LIPID PANEL: CPT

## 2019-05-08 PROCEDURE — 80053 COMPREHEN METABOLIC PANEL: CPT

## 2019-05-08 PROCEDURE — 85025 COMPLETE CBC W/AUTO DIFF WBC: CPT

## 2019-05-08 PROCEDURE — 83036 HEMOGLOBIN GLYCOSYLATED A1C: CPT

## 2019-07-01 ENCOUNTER — OFFICE VISIT (OUTPATIENT)
Dept: INTERNAL MEDICINE CLINIC | Facility: CLINIC | Age: 64
End: 2019-07-01
Payer: MEDICARE

## 2019-07-01 VITALS
RESPIRATION RATE: 18 BRPM | HEIGHT: 61 IN | SYSTOLIC BLOOD PRESSURE: 130 MMHG | DIASTOLIC BLOOD PRESSURE: 82 MMHG | TEMPERATURE: 99.1 F | HEART RATE: 98 BPM | WEIGHT: 146 LBS | OXYGEN SATURATION: 99 % | BODY MASS INDEX: 27.56 KG/M2

## 2019-07-01 DIAGNOSIS — R73.01 IMPAIRED FASTING GLUCOSE: Primary | ICD-10-CM

## 2019-07-01 DIAGNOSIS — R03.0 ELEVATED BLOOD PRESSURE READING WITHOUT DIAGNOSIS OF HYPERTENSION: ICD-10-CM

## 2019-07-01 DIAGNOSIS — R73.03 PREDIABETES: ICD-10-CM

## 2019-07-01 DIAGNOSIS — E78.5 HYPERLIPIDEMIA, UNSPECIFIED HYPERLIPIDEMIA TYPE: ICD-10-CM

## 2019-07-01 DIAGNOSIS — Z12.31 ENCOUNTER FOR SCREENING MAMMOGRAM FOR BREAST CANCER: ICD-10-CM

## 2019-07-01 DIAGNOSIS — M85.80 OSTEOPENIA, UNSPECIFIED LOCATION: ICD-10-CM

## 2019-07-01 PROCEDURE — 99214 OFFICE O/P EST MOD 30 MIN: CPT | Performed by: NURSE PRACTITIONER

## 2019-07-01 NOTE — PATIENT INSTRUCTIONS
1  Elevated BP without diagnosis of hypertension- Blood pressure decreased significantly after sitting for a while  2  Hyperlipidemia- LDL elevated, HDL also elevated, has been working out with  and watching her diet, will continue and recheck in six months  3  Prediabetes- A1c 5 7, please limit your white potatoes/bread/pasta/rice and will recheck in six months  4  Benign Positional Vertigo- Resolved  5  Osteopenia- Due for DEXA scan, did not tolerate last time due to vertigo, will have that done soon  Follow up with me as needed and with Dr Morgan Bedoya in six months, labs prior

## 2019-07-01 NOTE — PROGRESS NOTES
Assessment/Plan:    1  Elevated BP without diagnosis of hypertension- Blood pressure decreased significantly after sitting for a while  2  Hyperlipidemia- LDL elevated, HDL also elevated, has been working out with  and watching her diet, will continue and recheck in six months  3  Prediabetes- A1c 5 7, please limit your white potatoes/bread/pasta/rice and will recheck in six months  4  Benign Positional Vertigo- Resolved  5  Osteopenia- Due for DEXA scan, did not tolerate last time due to vertigo, will have that done soon  Follow up with me as needed and with Dr Ad Lopez in six months, labs prior  Diagnoses and all orders for this visit:    Impaired fasting glucose  -     Mammo screening bilateral w 3d & cad; Future  -     CBC and differential; Future  -     Comprehensive metabolic panel; Future  -     HEMOGLOBIN A1C W/ EAG ESTIMATION; Future  -     LDL cholesterol, direct; Future  -     Lipid panel; Future  -     TSH, 3rd generation with Free T4 reflex; Future  -     Microalbumin / creatinine urine ratio; Future    Elevated blood pressure reading without diagnosis of hypertension  -     Mammo screening bilateral w 3d & cad; Future  -     CBC and differential; Future  -     Comprehensive metabolic panel; Future  -     HEMOGLOBIN A1C W/ EAG ESTIMATION; Future  -     LDL cholesterol, direct; Future  -     Lipid panel; Future  -     TSH, 3rd generation with Free T4 reflex; Future  -     Microalbumin / creatinine urine ratio; Future    Hyperlipidemia, unspecified hyperlipidemia type  -     Mammo screening bilateral w 3d & cad; Future  -     CBC and differential; Future  -     Comprehensive metabolic panel; Future  -     HEMOGLOBIN A1C W/ EAG ESTIMATION; Future  -     LDL cholesterol, direct; Future  -     Lipid panel; Future  -     TSH, 3rd generation with Free T4 reflex; Future  -     Microalbumin / creatinine urine ratio;  Future    Osteopenia, unspecified location  -     Mammo screening bilateral w 3d & cad; Future  -     CBC and differential; Future  -     Comprehensive metabolic panel; Future  -     HEMOGLOBIN A1C W/ EAG ESTIMATION; Future  -     LDL cholesterol, direct; Future  -     Lipid panel; Future  -     TSH, 3rd generation with Free T4 reflex; Future  -     Microalbumin / creatinine urine ratio; Future  -     DXA body comp analysis; Future    Encounter for screening mammogram for breast cancer  -     Mammo screening bilateral w 3d & cad; Future  -     CBC and differential; Future  -     Comprehensive metabolic panel; Future  -     HEMOGLOBIN A1C W/ EAG ESTIMATION; Future  -     LDL cholesterol, direct; Future  -     Lipid panel; Future  -     TSH, 3rd generation with Free T4 reflex; Future  -     Microalbumin / creatinine urine ratio; Future    Prediabetes    Other orders  -     Lactobacillus Rhamnosus, GG, ([employer] PROBIOTIC, LACTOBACILLUS, PO); Take by mouth    The patient was counseled regarding instructions for management, risk factor reductions, patient and family education,impressions, risks and benefits of treatment options, side effects of medications, importance of compliance with treatment  The treatment plan was reviewed with the patient/guardian and patient/guardian understands and agrees with the treatment plan          Current Outpatient Medications:     acetaminophen (TYLENOL) 500 mg tablet, Take 2 tablets by mouth 3 (three) times a day, Disp: , Rfl:     aspirin (ECOTRIN LOW STRENGTH) 81 mg EC tablet, Take 81 mg by mouth, Disp: , Rfl:     cholecalciferol (VITAMIN D3) 1,000 units tablet, Take by mouth, Disp: , Rfl:     Chromium 1 MG CAPS, Take by mouth, Disp: , Rfl:     Glucosamine-Chondroitin 750-600 MG TABS, Take 1 tablet by mouth 2 (two) times a day, Disp: , Rfl:     loratadine (CLARITIN) 10 mg tablet, TAKE 1 TABLET BY MOUTH EVERY DAY, Disp: 30 tablet, Rfl: 0    meclizine (ANTIVERT) 25 mg tablet, Take 1 tablet (25 mg total) by mouth 3 (three) times a day as needed for dizziness, Disp: 30 tablet, Rfl: 1    Multiple Vitamins-Calcium (TGT DAILY MULTIVITAMIN WOMENS PO), Take by mouth, Disp: , Rfl:     Omega-3 Fatty Acids (FISH OIL) 645 MG CAPS, Take by mouth, Disp: , Rfl:     Lactobacillus Rhamnosus, GG, ([employer] PROBIOTIC, LACTOBACILLUS, PO), Take by mouth, Disp: , Rfl:     Subjective:      Patient ID: Ever Barros is a 75 y o  female  Has been working out with a  and changed her diet  The following portions of the patient's history were reviewed and updated as appropriate:   She has a past medical history of Gallbladder disease  ,  does not have any pertinent problems on file  ,   has a past surgical history that includes Cervical spine surgery and Cholecystectomy (1987)  ,  family history includes Hyperlipidemia in her father and mother; Osteoarthritis in her family; Other in her child, mother, and sister  ,   reports that she quit smoking about 38 years ago  She has never used smokeless tobacco  She reports that she does not drink alcohol or use drugs  ,  is allergic to other; pollen extract; and macrobid [nitrofurantoin]       Review of Systems   Constitutional: Negative  Respiratory: Negative  Cardiovascular: Negative  Musculoskeletal: Negative  Psychiatric/Behavioral: Negative            Objective:  /82 (BP Location: Left arm, Patient Position: Sitting, Cuff Size: Standard)   Pulse 98   Temp 99 1 °F (37 3 °C)   Resp 18   Ht 5' 1" (1 549 m)   Wt 66 2 kg (146 lb)   SpO2 99%   BMI 27 59 kg/m²     Lab Review  Appointment on 05/08/2019   Component Date Value    Hepatitis C Ab 05/08/2019 Non-reactive     WBC 05/08/2019 6 88     RBC 05/08/2019 4 81     Hemoglobin 05/08/2019 13 3     Hematocrit 05/08/2019 41 6     MCV 05/08/2019 87     MCH 05/08/2019 27 7     MCHC 05/08/2019 32 0     RDW 05/08/2019 13 6     MPV 05/08/2019 11 4     Platelets 74/13/2065 218     nRBC 05/08/2019 0     Neutrophils Relative 05/08/2019 48     Immat GRANS % 05/08/2019 0     Lymphocytes Relative 05/08/2019 43     Monocytes Relative 05/08/2019 6     Eosinophils Relative 05/08/2019 2     Basophils Relative 05/08/2019 1     Neutrophils Absolute 05/08/2019 3 28     Immature Grans Absolute 05/08/2019 0 02     Lymphocytes Absolute 05/08/2019 2 94     Monocytes Absolute 05/08/2019 0 44     Eosinophils Absolute 05/08/2019 0 16     Basophils Absolute 05/08/2019 0 04     Cholesterol 05/08/2019 260*    Triglycerides 05/08/2019 94     HDL, Direct 05/08/2019 63*    LDL Calculated 05/08/2019 178*    Sodium 05/08/2019 143     Potassium 05/08/2019 3 6     Chloride 05/08/2019 105     CO2 05/08/2019 28     ANION GAP 05/08/2019 10     BUN 05/08/2019 12     Creatinine 05/08/2019 0 77     Glucose, Fasting 05/08/2019 109*    Calcium 05/08/2019 9 2     AST 05/08/2019 18     ALT 05/08/2019 26     Alkaline Phosphatase 05/08/2019 78     Total Protein 05/08/2019 7 2     Albumin 05/08/2019 3 6     Total Bilirubin 05/08/2019 0 60     eGFR 05/08/2019 81     Hemoglobin A1C 05/08/2019 5 7     EAG 05/08/2019 117       Imaging: No results found  Physical Exam   Constitutional: She is oriented to person, place, and time  She appears well-developed and well-nourished  Cardiovascular: Normal rate, regular rhythm, normal heart sounds and intact distal pulses  Pulmonary/Chest: Effort normal and breath sounds normal    Musculoskeletal: Normal range of motion  Neurological: She is alert and oriented to person, place, and time  She has normal reflexes  Psychiatric: She has a normal mood and affect   Her behavior is normal  Judgment and thought content normal

## 2019-11-19 ENCOUNTER — TRANSCRIBE ORDERS (OUTPATIENT)
Dept: ADMINISTRATIVE | Facility: HOSPITAL | Age: 64
End: 2019-11-19

## 2019-11-19 DIAGNOSIS — M85.80 OSTEOPENIA, UNSPECIFIED LOCATION: Primary | ICD-10-CM

## 2019-11-27 ENCOUNTER — TELEPHONE (OUTPATIENT)
Dept: INTERNAL MEDICINE CLINIC | Facility: CLINIC | Age: 64
End: 2019-11-27

## 2019-11-27 NOTE — TELEPHONE ENCOUNTER
Good Morning , Patient called today presenting Possible UTI symptoms  Patient c/o burning w/ urination  Blood in urine  Frequent urination  Denies any fever or chills  Patient asking for U/A / Culture be ordered w/ medication upon results  Please call when order is in       # [phone]

## 2019-11-27 NOTE — TELEPHONE ENCOUNTER
Patient advised to go to the Urgent care since we don't have any openings today  Patient verbally agreed

## 2020-01-08 ENCOUNTER — APPOINTMENT (OUTPATIENT)
Dept: LAB | Facility: HOSPITAL | Age: 65
End: 2020-01-08
Payer: MEDICARE

## 2020-01-08 DIAGNOSIS — E78.5 HYPERLIPIDEMIA, UNSPECIFIED HYPERLIPIDEMIA TYPE: ICD-10-CM

## 2020-01-08 DIAGNOSIS — M85.80 OSTEOPENIA, UNSPECIFIED LOCATION: ICD-10-CM

## 2020-01-08 DIAGNOSIS — Z12.31 ENCOUNTER FOR SCREENING MAMMOGRAM FOR BREAST CANCER: ICD-10-CM

## 2020-01-08 DIAGNOSIS — R03.0 ELEVATED BLOOD PRESSURE READING WITHOUT DIAGNOSIS OF HYPERTENSION: ICD-10-CM

## 2020-01-08 DIAGNOSIS — R73.01 IMPAIRED FASTING GLUCOSE: ICD-10-CM

## 2020-01-08 LAB
ALBUMIN SERPL BCP-MCNC: 3.6 G/DL (ref 3.5–5)
ALP SERPL-CCNC: 79 U/L (ref 46–116)
ALT SERPL W P-5'-P-CCNC: 22 U/L (ref 12–78)
ANION GAP SERPL CALCULATED.3IONS-SCNC: 8 MMOL/L (ref 4–13)
AST SERPL W P-5'-P-CCNC: 21 U/L (ref 5–45)
BASOPHILS # BLD AUTO: 0.04 THOUSANDS/ΜL (ref 0–0.1)
BASOPHILS NFR BLD AUTO: 1 % (ref 0–1)
BILIRUB SERPL-MCNC: 0.6 MG/DL (ref 0.2–1)
BUN SERPL-MCNC: 13 MG/DL (ref 5–25)
CALCIUM SERPL-MCNC: 9 MG/DL (ref 8.3–10.1)
CHLORIDE SERPL-SCNC: 104 MMOL/L (ref 100–108)
CHOLEST SERPL-MCNC: 269 MG/DL (ref 50–200)
CO2 SERPL-SCNC: 28 MMOL/L (ref 21–32)
CREAT SERPL-MCNC: 0.73 MG/DL (ref 0.6–1.3)
CREAT UR-MCNC: 93.4 MG/DL
EOSINOPHIL # BLD AUTO: 0.14 THOUSAND/ΜL (ref 0–0.61)
EOSINOPHIL NFR BLD AUTO: 2 % (ref 0–6)
ERYTHROCYTE [DISTWIDTH] IN BLOOD BY AUTOMATED COUNT: 13.6 % (ref 11.6–15.1)
EST. AVERAGE GLUCOSE BLD GHB EST-MCNC: 117 MG/DL
GFR SERPL CREATININE-BSD FRML MDRD: 86 ML/MIN/1.73SQ M
GLUCOSE P FAST SERPL-MCNC: 103 MG/DL (ref 65–99)
HBA1C MFR BLD: 5.7 % (ref 4.2–6.3)
HCT VFR BLD AUTO: 43.1 % (ref 34.8–46.1)
HDLC SERPL-MCNC: 64 MG/DL
HGB BLD-MCNC: 13.3 G/DL (ref 11.5–15.4)
IMM GRANULOCYTES # BLD AUTO: 0.02 THOUSAND/UL (ref 0–0.2)
IMM GRANULOCYTES NFR BLD AUTO: 0 % (ref 0–2)
LDLC SERPL CALC-MCNC: 183 MG/DL (ref 0–100)
LDLC SERPL DIRECT ASSAY-MCNC: 169 MG/DL (ref 0–100)
LYMPHOCYTES # BLD AUTO: 3.2 THOUSANDS/ΜL (ref 0.6–4.47)
LYMPHOCYTES NFR BLD AUTO: 42 % (ref 14–44)
MCH RBC QN AUTO: 26.8 PG (ref 26.8–34.3)
MCHC RBC AUTO-ENTMCNC: 30.9 G/DL (ref 31.4–37.4)
MCV RBC AUTO: 87 FL (ref 82–98)
MICROALBUMIN UR-MCNC: 15.4 MG/L (ref 0–20)
MICROALBUMIN/CREAT 24H UR: 16 MG/G CREATININE (ref 0–30)
MONOCYTES # BLD AUTO: 0.51 THOUSAND/ΜL (ref 0.17–1.22)
MONOCYTES NFR BLD AUTO: 7 % (ref 4–12)
NEUTROPHILS # BLD AUTO: 3.68 THOUSANDS/ΜL (ref 1.85–7.62)
NEUTS SEG NFR BLD AUTO: 48 % (ref 43–75)
NONHDLC SERPL-MCNC: 205 MG/DL
NRBC BLD AUTO-RTO: 0 /100 WBCS
PLATELET # BLD AUTO: 235 THOUSANDS/UL (ref 149–390)
PMV BLD AUTO: 12 FL (ref 8.9–12.7)
POTASSIUM SERPL-SCNC: 4.4 MMOL/L (ref 3.5–5.3)
PROT SERPL-MCNC: 7.2 G/DL (ref 6.4–8.2)
RBC # BLD AUTO: 4.96 MILLION/UL (ref 3.81–5.12)
SODIUM SERPL-SCNC: 140 MMOL/L (ref 136–145)
TRIGL SERPL-MCNC: 108 MG/DL
TSH SERPL DL<=0.05 MIU/L-ACNC: 2.55 UIU/ML (ref 0.36–3.74)
WBC # BLD AUTO: 7.59 THOUSAND/UL (ref 4.31–10.16)

## 2020-01-08 PROCEDURE — 82043 UR ALBUMIN QUANTITATIVE: CPT

## 2020-01-08 PROCEDURE — 85025 COMPLETE CBC W/AUTO DIFF WBC: CPT

## 2020-01-08 PROCEDURE — 82570 ASSAY OF URINE CREATININE: CPT

## 2020-01-08 PROCEDURE — 80061 LIPID PANEL: CPT

## 2020-01-08 PROCEDURE — 83036 HEMOGLOBIN GLYCOSYLATED A1C: CPT

## 2020-01-08 PROCEDURE — 84443 ASSAY THYROID STIM HORMONE: CPT

## 2020-01-08 PROCEDURE — 36415 COLL VENOUS BLD VENIPUNCTURE: CPT

## 2020-01-08 PROCEDURE — 83721 ASSAY OF BLOOD LIPOPROTEIN: CPT

## 2020-01-08 PROCEDURE — 80053 COMPREHEN METABOLIC PANEL: CPT

## 2020-01-09 ENCOUNTER — HOSPITAL ENCOUNTER (OUTPATIENT)
Dept: MAMMOGRAPHY | Facility: CLINIC | Age: 65
Discharge: HOME | End: 2020-01-09
Payer: MEDICARE

## 2020-01-09 DIAGNOSIS — M85.80 OSTEOPENIA, UNSPECIFIED LOCATION: ICD-10-CM

## 2020-01-09 PROCEDURE — 77080 DXA BONE DENSITY AXIAL: CPT

## 2020-01-10 ENCOUNTER — TELEPHONE (OUTPATIENT)
Dept: INTERNAL MEDICINE CLINIC | Facility: CLINIC | Age: 65
End: 2020-01-10

## 2020-01-10 NOTE — TELEPHONE ENCOUNTER
----- Message from [facility], [address] sent at 1/10/2020  9:00 AM EST -----  Please call  DEXA scan shows osteopenia in lumbar spine  Continue Calcium and Vitamin D and add weight bearing exercise  Keep appt later this month   Thanks

## 2020-01-21 ENCOUNTER — OFFICE VISIT (OUTPATIENT)
Dept: INTERNAL MEDICINE CLINIC | Facility: CLINIC | Age: 65
End: 2020-01-21
Payer: MEDICARE

## 2020-01-21 VITALS
RESPIRATION RATE: 16 BRPM | WEIGHT: 149 LBS | BODY MASS INDEX: 28.13 KG/M2 | HEART RATE: 76 BPM | TEMPERATURE: 97.6 F | HEIGHT: 61 IN | SYSTOLIC BLOOD PRESSURE: 160 MMHG | DIASTOLIC BLOOD PRESSURE: 80 MMHG | OXYGEN SATURATION: 98 %

## 2020-01-21 DIAGNOSIS — R73.03 PREDIABETES: ICD-10-CM

## 2020-01-21 DIAGNOSIS — M85.80 OSTEOPENIA, UNSPECIFIED LOCATION: ICD-10-CM

## 2020-01-21 DIAGNOSIS — Z23 NEED FOR PNEUMOCOCCAL VACCINATION: ICD-10-CM

## 2020-01-21 DIAGNOSIS — E78.5 HYPERLIPIDEMIA, UNSPECIFIED HYPERLIPIDEMIA TYPE: ICD-10-CM

## 2020-01-21 DIAGNOSIS — R73.01 IMPAIRED FASTING GLUCOSE: ICD-10-CM

## 2020-01-21 DIAGNOSIS — H81.13 BENIGN PAROXYSMAL POSITIONAL VERTIGO DUE TO BILATERAL VESTIBULAR DISORDER: ICD-10-CM

## 2020-01-21 DIAGNOSIS — E78.89 ELEVATED HDL: ICD-10-CM

## 2020-01-21 DIAGNOSIS — R03.0 ELEVATED BLOOD PRESSURE READING WITHOUT DIAGNOSIS OF HYPERTENSION: ICD-10-CM

## 2020-01-21 DIAGNOSIS — R03.0 ELEVATED BLOOD PRESSURE READING: ICD-10-CM

## 2020-01-21 DIAGNOSIS — Z13.6 SCREENING, ISCHEMIC HEART DISEASE: ICD-10-CM

## 2020-01-21 PROCEDURE — 90732 PPSV23 VACC 2 YRS+ SUBQ/IM: CPT

## 2020-01-21 PROCEDURE — 99214 OFFICE O/P EST MOD 30 MIN: CPT | Performed by: INTERNAL MEDICINE

## 2020-01-21 PROCEDURE — G0009 ADMIN PNEUMOCOCCAL VACCINE: HCPCS

## 2020-01-21 RX ORDER — PHENOL 1.4 %
AEROSOL, SPRAY (ML) MUCOUS MEMBRANE
COMMUNITY
Start: 2020-01-12 | End: 2020-01-21

## 2020-01-21 NOTE — PATIENT INSTRUCTIONS
Obesity   AMBULATORY CARE:   Obesity  is when your body mass index (BMI) is greater than 30  Your healthcare provider will use your height and weight to measure your BMI  The risks of obesity include  many health problems, such as injuries or physical disability  You may need tests to check for the following:  · Diabetes     · High blood pressure or high cholesterol     · Heart disease     · Gallbladder or liver disease     · Cancer of the colon, breast, prostate, liver, or kidney     · Sleep apnea     · Arthritis or gout  Seek care immediately if:   · You have a severe headache, confusion, or difficulty speaking  · You have weakness on one side of your body  · You have chest pain, sweating, or shortness of breath  Contact your healthcare provider if:   · You have symptoms of gallbladder or liver disease, such as pain in your upper abdomen  · You have knee or hip pain and discomfort while walking  · You have symptoms of diabetes, such as intense hunger and thirst, and frequent urination  · You have symptoms of sleep apnea, such as snoring or daytime sleepiness  · You have questions or concerns about your condition or care  Treatment for obesity  focuses on helping you lose weight to improve your health  Even a small decrease in BMI can reduce the risk for many health problems  Your healthcare provider will help you set a weight-loss goal   · Lifestyle changes  are the first step in treating obesity  These include making healthy food choices and getting regular physical activity  Your healthcare provider may suggest a weight-loss program that involves coaching, education, and therapy  · Medicine  may help you lose weight when it is used with a healthy diet and physical activity  · Surgery  can help you lose weight if you are very obese and have other health problems  There are several types of weight-loss surgery  Ask your healthcare provider for more information    Be successful losing weight:   · Set small, realistic goals  An example of a small goal is to walk for 20 minutes 5 days a week  Anther goal is to lose 5% of your body weight  · Tell friends, family members, and coworkers about your goals  and ask for their support  Ask a friend to lose weight with you, or join a weight-loss support group  · Identify foods or triggers that may cause you to overeat , and find ways to avoid them  Remove tempting high-calorie foods from your home and workplace  Place a bowl of fresh fruit on your kitchen counter  If stress causes you to eat, then find other ways to cope with stress  · Keep a diary to track what you eat and drink  Also write down how many minutes of physical activity you do each day  Weigh yourself once a week and record it in your diary  Eating changes: You will need to eat 500 to 1,000 fewer calories each day than you currently eat to lose 1 to 2 pounds a week  The following changes will help you cut calories:  · Eat smaller portions  Use small plates, no larger than 9 inches in diameter  Fill your plate half full of fruits and vegetables  Measure your food using measuring cups until you know what a serving size looks like  · Eat 3 meals and 1 or 2 snacks each day  Plan your meals in advance  [place] and eat at home most of the time  Eat slowly  · Eat fruits and vegetables at every meal   They are low in calories and high in fiber, which makes you feel full  Do not add butter, margarine, or cream sauce to vegetables  Use herbs to season steamed vegetables  · Eat less fat and fewer fried foods  Eat more baked or grilled chicken and fish  These protein sources are lower in calories and fat than red meat  Limit fast food  Dress your salads with olive oil and vinegar instead of bottled dressing  · Limit the amount of sugar you eat  Do not drink sugary beverages  Limit alcohol  Activity changes:  Physical activity is good for your body in many ways   It helps you burn calories and build strong muscles  It decreases stress and depression, and improves your mood  It can also help you sleep better  Talk to your healthcare provider before you begin an exercise program   · Exercise for at least 30 minutes 5 days a week  Start slowly  Set aside time each day for physical activity that you enjoy and that is convenient for you  It is best to do both weight training and an activity that increases your heart rate, such as walking, bicycling, or swimming  · Find ways to be more active  Do yard work and housecleaning  Walk up the stairs instead of using elevators  Spend your leisure time going to events that require walking, such as outdoor festivals or fairs  This extra physical activity can help you lose weight and keep it off  Follow up with your healthcare provider as directed: You may need to meet with a dietitian  Write down your questions so you remember to ask them during your visits  © 2017 [employer] Information is for End User's use only and may not be sold, redistributed or otherwise used for commercial purposes  All illustrations and images included in CareNotes® are the copyrighted property of [employer]  or [employer]ain[employer]  The above information is an  only  It is not intended as medical advice for individual conditions or treatments  Talk to your doctor, nurse or pharmacist before following any medical regimen to see if it is safe and effective for you  Obesity   AMBULATORY CARE:   Obesity  is when your body mass index (BMI) is greater than 30  Your healthcare provider will use your height and weight to measure your BMI  The risks of obesity include  many health problems, such as injuries or physical disability   You may need tests to check for the following:  · Diabetes     · High blood pressure or high cholesterol     · Heart disease     · Gallbladder or liver disease     · Cancer of the colon, breast, prostate, liver, or kidney     · Sleep apnea     · Arthritis or gout  Seek care immediately if:   · You have a severe headache, confusion, or difficulty speaking  · You have weakness on one side of your body  · You have chest pain, sweating, or shortness of breath  Contact your healthcare provider if:   · You have symptoms of gallbladder or liver disease, such as pain in your upper abdomen  · You have knee or hip pain and discomfort while walking  · You have symptoms of diabetes, such as intense hunger and thirst, and frequent urination  · You have symptoms of sleep apnea, such as snoring or daytime sleepiness  · You have questions or concerns about your condition or care  Treatment for obesity  focuses on helping you lose weight to improve your health  Even a small decrease in BMI can reduce the risk for many health problems  Your healthcare provider will help you set a weight-loss goal   · Lifestyle changes  are the first step in treating obesity  These include making healthy food choices and getting regular physical activity  Your healthcare provider may suggest a weight-loss program that involves coaching, education, and therapy  · Medicine  may help you lose weight when it is used with a healthy diet and physical activity  · Surgery  can help you lose weight if you are very obese and have other health problems  There are several types of weight-loss surgery  Ask your healthcare provider for more information  Be successful losing weight:   · Set small, realistic goals  An example of a small goal is to walk for 20 minutes 5 days a week  Anther goal is to lose 5% of your body weight  · Tell friends, family members, and coworkers about your goals  and ask for their support  Ask a friend to lose weight with you, or join a weight-loss support group  · Identify foods or triggers that may cause you to overeat , and find ways to avoid them   Remove tempting high-calorie foods from your home and workplace  Place a bowl of fresh fruit on your kitchen counter  If stress causes you to eat, then find other ways to cope with stress  · Keep a diary to track what you eat and drink  Also write down how many minutes of physical activity you do each day  Weigh yourself once a week and record it in your diary  Eating changes: You will need to eat 500 to 1,000 fewer calories each day than you currently eat to lose 1 to 2 pounds a week  The following changes will help you cut calories:  · Eat smaller portions  Use small plates, no larger than 9 inches in diameter  Fill your plate half full of fruits and vegetables  Measure your food using measuring cups until you know what a serving size looks like  · Eat 3 meals and 1 or 2 snacks each day  Plan your meals in advance  [facility] and eat at home most of the time  Eat slowly  · Eat fruits and vegetables at every meal   They are low in calories and high in fiber, which makes you feel full  Do not add butter, margarine, or cream sauce to vegetables  Use herbs to season steamed vegetables  · Eat less fat and fewer fried foods  Eat more baked or grilled chicken and fish  These protein sources are lower in calories and fat than red meat  Limit fast food  Dress your salads with olive oil and vinegar instead of bottled dressing  · Limit the amount of sugar you eat  Do not drink sugary beverages  Limit alcohol  Activity changes:  Physical activity is good for your body in many ways  It helps you burn calories and build strong muscles  It decreases stress and depression, and improves your mood  It can also help you sleep better  Talk to your healthcare provider before you begin an exercise program   · Exercise for at least 30 minutes 5 days a week  Start slowly  Set aside time each day for physical activity that you enjoy and that is convenient for you   It is best to do both weight training and an activity that increases your heart rate, such as walking, bicycling, or swimming  · Find ways to be more active  Do yard work and housecleaning  Walk up the stairs instead of using elevators  Spend your leisure time going to events that require walking, such as outdoor festivals or fairs  This extra physical activity can help you lose weight and keep it off  Follow up with your healthcare provider as directed: You may need to meet with a dietitian  Write down your questions so you remember to ask them during your visits  © 2017 [employer]  Information is for End User's use only and may not be sold, redistributed or otherwise used for commercial purposes  All illustrations and images included in CareNotes® are the copyrighted property of [employer]  or [employer]ain[employer]  The above information is an  only  It is not intended as medical advice for individual conditions or treatments  Talk to your doctor, nurse or pharmacist before following any medical regimen to see if it is safe and effective for you  1 hypertension question of white coat syndrome patient will be monitoring her blood pressure at home coming in for free blood pressure checks blood pressure goal less than 130/80 like her check at least a couple times a week initially and then at least a couple times a month and to come in if her blood pressure remains above her goal for treatment for high blood pressure will hold aspirin has few risk factors  2  Osteopenia continue her weightlifting program as well as  will recheck in a year  3  High HDL mildly elevated LDL will recheck in a year diet lifestyle has few risk factors for coronary disease may consider coronary calcium scoring  4   Very mildly elevated fasting blood sugar A1c is normal will recheck in 1 year

## 2020-01-21 NOTE — PROGRESS NOTES
Assessment/Plan:  bp el 130/80,     1 hypertension question of white coat syndrome patient will be monitoring her blood pressure at home coming in for free blood pressure checks blood pressure goal less than 130/80 like her check at least a couple times a week initially and then at least a couple times a month and to come in if her blood pressure remains above her goal for treatment for high blood pressure will hold aspirin has few risk factors  2  Osteopenia continue her weightlifting program as well as  will recheck in a year  3  High HDL mildly elevated LDL will recheck in a year diet lifestyle has few risk factors for coronary disease may consider coronary calcium scoring  4  Very mildly elevated fasting blood sugar A1c is normal will recheck in 1 year           Diagnoses and all orders for this visit:    BMI 28 0-28 9,adult  -     CBC and differential; Future  -     Comprehensive metabolic panel; Future  -     LDL cholesterol, direct; Future  -     Lipid Panel with Direct LDL reflex; Future  -     Hemoglobin A1C; Future  -     Vitamin D 25 hydroxy; Future  -     TSH, 3rd generation with Free T4 reflex; Future    Hyperlipidemia, unspecified hyperlipidemia type  -     CBC and differential; Future  -     Comprehensive metabolic panel; Future  -     LDL cholesterol, direct; Future  -     Lipid Panel with Direct LDL reflex; Future  -     Hemoglobin A1C; Future  -     Vitamin D 25 hydroxy; Future  -     TSH, 3rd generation with Free T4 reflex; Future    Impaired fasting glucose  -     CBC and differential; Future  -     Comprehensive metabolic panel; Future  -     LDL cholesterol, direct; Future  -     Lipid Panel with Direct LDL reflex; Future  -     Hemoglobin A1C; Future  -     Vitamin D 25 hydroxy; Future  -     TSH, 3rd generation with Free T4 reflex; Future    Prediabetes  -     CBC and differential; Future  -     Comprehensive metabolic panel; Future  -     LDL cholesterol, direct;  Future  - Lipid Panel with Direct LDL reflex; Future  -     Hemoglobin A1C; Future  -     Vitamin D 25 hydroxy; Future  -     TSH, 3rd generation with Free T4 reflex; Future    Elevated blood pressure reading  -     CBC and differential; Future  -     Comprehensive metabolic panel; Future  -     LDL cholesterol, direct; Future  -     Lipid Panel with Direct LDL reflex; Future  -     Hemoglobin A1C; Future  -     Vitamin D 25 hydroxy; Future  -     TSH, 3rd generation with Free T4 reflex; Future    Osteopenia, unspecified location  -     CBC and differential; Future  -     Comprehensive metabolic panel; Future  -     LDL cholesterol, direct; Future  -     Lipid Panel with Direct LDL reflex; Future  -     Hemoglobin A1C; Future  -     Vitamin D 25 hydroxy; Future  -     TSH, 3rd generation with Free T4 reflex; Future    Elevated HDL  -     CBC and differential; Future  -     Comprehensive metabolic panel; Future  -     LDL cholesterol, direct; Future  -     Lipid Panel with Direct LDL reflex; Future  -     Hemoglobin A1C; Future  -     Vitamin D 25 hydroxy; Future  -     TSH, 3rd generation with Free T4 reflex; Future    Benign paroxysmal positional vertigo due to bilateral vestibular disorder  -     CBC and differential; Future  -     Comprehensive metabolic panel; Future  -     LDL cholesterol, direct; Future  -     Lipid Panel with Direct LDL reflex; Future  -     Hemoglobin A1C; Future  -     Vitamin D 25 hydroxy; Future  -     TSH, 3rd generation with Free T4 reflex; Future    Elevated blood pressure reading without diagnosis of hypertension  -     CBC and differential; Future  -     Comprehensive metabolic panel; Future  -     LDL cholesterol, direct; Future  -     Lipid Panel with Direct LDL reflex; Future  -     Hemoglobin A1C; Future  -     Vitamin D 25 hydroxy; Future  -     TSH, 3rd generation with Free T4 reflex;  Future    Screening, ischemic heart disease  -     CBC and differential; Future  -     Comprehensive metabolic panel; Future  -     LDL cholesterol, direct; Future  -     Lipid Panel with Direct LDL reflex; Future  -     Hemoglobin A1C; Future  -     Vitamin D 25 hydroxy; Future  -     TSH, 3rd generation with Free T4 reflex; Future  -     CT coronary calcium score; Future    Other orders  -     Discontinue: calcium carbonate (CALCIUM 600) 600 MG tablet        The patient was counseled regarding instructions for management, risk factor reductions, patient and family education,impressions, risks and benefits of treatment options, side effects of medications, importance of compliance with treatment  The treatment plan was reviewed with the patient/guardian and patient/guardian understands and agrees with the treatment plan  Current Outpatient Medications:     acetaminophen (TYLENOL) 500 mg tablet, Take 2 tablets by mouth 3 (three) times a day, Disp: , Rfl:     cholecalciferol (VITAMIN D3) 1,000 units tablet, Take by mouth, Disp: , Rfl:     Chromium 1 MG CAPS, Take by mouth, Disp: , Rfl:     Glucosamine-Chondroitin 750-600 MG TABS, Take 1 tablet by mouth 2 (two) times a day, Disp: , Rfl:     Lactobacillus Rhamnosus, GG, ([employer] PROBIOTIC, LACTOBACILLUS, PO), Take by mouth, Disp: , Rfl:     loratadine (CLARITIN) 10 mg tablet, TAKE 1 TABLET BY MOUTH EVERY DAY, Disp: 30 tablet, Rfl: 0    meclizine (ANTIVERT) 25 mg tablet, Take 1 tablet (25 mg total) by mouth 3 (three) times a day as needed for dizziness, Disp: 30 tablet, Rfl: 1    Multiple Vitamins-Calcium (TGT DAILY MULTIVITAMIN WOMENS PO), Take by mouth, Disp: , Rfl:     Omega-3 Fatty Acids (FISH OIL) 645 MG CAPS, Take by mouth, Disp: , Rfl:     Subjective:      Patient ID: Leo Barreto is a 75 y o  female  Tolerating medications well      The following portions of the patient's history were reviewed and updated as appropriate:   She has a past medical history of Gallbladder disease  ,  does not have any pertinent problems on file  ,   has a past surgical history that includes Cervical spine surgery and Cholecystectomy (1987)  ,  family history includes Hyperlipidemia in her father and mother; Osteoarthritis in her family; Other in her child, mother, and sister  ,   reports that she quit smoking about 38 years ago  She has never used smokeless tobacco  She reports that she does not drink alcohol or use drugs  ,  is allergic to other; pollen extract; and macrobid [nitrofurantoin]       Review of Systems   Constitutional: Negative for appetite change, chills, fatigue, fever and unexpected weight change  HENT: Negative for congestion, ear pain, facial swelling, hearing loss, mouth sores, nosebleeds, postnasal drip, rhinorrhea, sinus pain, sore throat, trouble swallowing and voice change  Eyes: Negative for pain, discharge, redness and visual disturbance  Respiratory: Negative for apnea, chest tightness, shortness of breath, wheezing and stridor  Cardiovascular: Negative for chest pain, palpitations and leg swelling  Gastrointestinal: Negative for abdominal distention, abdominal pain, blood in stool, constipation, diarrhea and vomiting  Endocrine: Negative for cold intolerance, heat intolerance, polydipsia, polyphagia and polyuria  Genitourinary: Negative for difficulty urinating, dysuria, flank pain, frequency, genital sores, hematuria and urgency  Musculoskeletal: Negative for arthralgias and back pain  Skin: Negative for rash and wound  Allergic/Immunologic: Negative for environmental allergies, food allergies and immunocompromised state  Neurological: Negative for dizziness, tremors, seizures, syncope, facial asymmetry, speech difficulty, weakness, light-headedness, numbness and headaches  Hematological: Negative for adenopathy  Does not bruise/bleed easily  Psychiatric/Behavioral: Negative for agitation, behavioral problems, dysphoric mood, hallucinations, self-injury, sleep disturbance and suicidal ideas   The patient is not hyperactive  Objective:  /80 (BP Location: Left arm, Patient Position: Sitting)   Pulse 76   Temp 97 6 °F (36 4 °C)   Resp 16   Ht 5' 1" (1 549 m)   Wt 67 6 kg (149 lb)   SpO2 98%   BMI 28 15 kg/m²     Lab Review  Appointment on 01/08/2020   Component Date Value    WBC 01/08/2020 7 59     RBC 01/08/2020 4 96     Hemoglobin 01/08/2020 13 3     Hematocrit 01/08/2020 43 1     MCV 01/08/2020 87     MCH 01/08/2020 26 8     MCHC 01/08/2020 30 9*    RDW 01/08/2020 13 6     MPV 01/08/2020 12 0     Platelets 73/77/5513 235     nRBC 01/08/2020 0     Neutrophils Relative 01/08/2020 48     Immat GRANS % 01/08/2020 0     Lymphocytes Relative 01/08/2020 42     Monocytes Relative 01/08/2020 7     Eosinophils Relative 01/08/2020 2     Basophils Relative 01/08/2020 1     Neutrophils Absolute 01/08/2020 3 68     Immature Grans Absolute 01/08/2020 0 02     Lymphocytes Absolute 01/08/2020 3 20     Monocytes Absolute 01/08/2020 0 51     Eosinophils Absolute 01/08/2020 0 14     Basophils Absolute 01/08/2020 0 04     Sodium 01/08/2020 140     Potassium 01/08/2020 4 4     Chloride 01/08/2020 104     CO2 01/08/2020 28     ANION GAP 01/08/2020 8     BUN 01/08/2020 13     Creatinine 01/08/2020 0 73     Glucose, Fasting 01/08/2020 103*    Calcium 01/08/2020 9 0     AST 01/08/2020 21     ALT 01/08/2020 22     Alkaline Phosphatase 01/08/2020 79     Total Protein 01/08/2020 7 2     Albumin 01/08/2020 3 6     Total Bilirubin 01/08/2020 0 60     eGFR 01/08/2020 86     Hemoglobin A1C 01/08/2020 5 7     EAG 01/08/2020 117     LDL Direct 01/08/2020 169*    Cholesterol 01/08/2020 269*    Triglycerides 01/08/2020 108     HDL, Direct 01/08/2020 64     LDL Calculated 01/08/2020 183*    Non-HDL-Chol (CHOL-HDL) 01/08/2020 205     TSH 3RD GENERATON 01/08/2020 2 549     Creatinine, Ur 01/08/2020 93 4     Microalbum  ,U,Random 01/08/2020 15 4     Microalb Creat Ratio 01/08/2020 16 Imaging  @[email or web address]@     CT coronary calcium score    (Results Pending)     No results found for this or any previous visit  Physical Exam   Constitutional: She is oriented to person, place, and time  She appears well-developed  HENT:   Right Ear: External ear normal    Left Ear: External ear normal    Eyes: Right eye exhibits no discharge  Left eye exhibits no discharge  No scleral icterus  Neck: Carotid bruit is not present  No tracheal deviation present  No thyroid mass and no thyromegaly present  Cardiovascular: Normal rate, regular rhythm, normal heart sounds and intact distal pulses  Exam reveals no gallop and no friction rub  No murmur heard  Pulmonary/Chest: No respiratory distress  She has no wheezes  She has no rales  Musculoskeletal: She exhibits no edema  Lymphadenopathy:     She has no cervical adenopathy  Neurological: She is alert and oriented to person, place, and time  Coordination normal    Psychiatric: She has a normal mood and affect  Her behavior is normal  Judgment and thought content normal    Nursing note and vitals reviewed  BMI Counseling: Body mass index is 28 15 kg/m²  The BMI is above normal  Nutrition recommendations include reducing portion sizes, decreasing overall calorie intake, 3-5 servings of fruits/vegetables daily, reducing fast food intake, consuming healthier snacks, decreasing soda and/or juice intake, moderation in carbohydrate intake, increasing intake of lean protein and reducing intake of saturated fat and trans fat  Exercise recommendations include moderate aerobic physical activity for 150 minutes/week, exercising 3-5 times per week and strength training exercises  BMI Counseling: Body mass index is 28 15 kg/m²   The BMI is above normal  Nutrition recommendations include reducing portion sizes, decreasing overall calorie intake, 3-5 servings of fruits/vegetables daily, reducing fast food intake, consuming healthier snacks, decreasing soda and/or juice intake, moderation in carbohydrate intake, increasing intake of lean protein and reducing intake of saturated fat and trans fat

## 2020-02-04 ENCOUNTER — HOSPITAL ENCOUNTER (OUTPATIENT)
Dept: CT IMAGING | Facility: HOSPITAL | Age: 65
Discharge: HOME | End: 2020-02-04
Attending: INTERNAL MEDICINE
Payer: COMMERCIAL

## 2020-02-04 DIAGNOSIS — Z13.6 SCREENING, ISCHEMIC HEART DISEASE: ICD-10-CM

## 2020-02-10 DIAGNOSIS — N28.1 CYST OF RIGHT KIDNEY: Primary | ICD-10-CM

## 2020-02-11 ENCOUNTER — TELEPHONE (OUTPATIENT)
Dept: INTERNAL MEDICINE CLINIC | Facility: CLINIC | Age: 65
End: 2020-02-11

## 2020-02-11 NOTE — TELEPHONE ENCOUNTER
----- Message from [facility], [address] sent at 2/10/2020 12:50 PM EST -----  Please call  Coronary calcium score is zero but it picked up on a small cyst on her right kidney   I ordered u/s kidneys thanks

## 2020-02-11 NOTE — TELEPHONE ENCOUNTER
S/w pt results reviewed and discussed  Phone number provided for Pt to schedule u/s kidneys as requested

## 2020-02-14 ENCOUNTER — HOSPITAL ENCOUNTER (OUTPATIENT)
Dept: ULTRASOUND IMAGING | Facility: HOSPITAL | Age: 65
Discharge: HOME | End: 2020-02-14
Payer: MEDICARE

## 2020-02-14 DIAGNOSIS — N28.1 CYST OF RIGHT KIDNEY: ICD-10-CM

## 2020-02-14 PROCEDURE — 76770 US EXAM ABDO BACK WALL COMP: CPT

## 2020-02-14 NOTE — LETTER
[address] [place] [address]  [address] [facility] [id number]      February 21, 2020    MRN: [id number]     Phone: [phone]     Dear Ms Alla Orozco recently had a(n) Ultrasound performed on 2/14/2020 at  [address] [place] [address] that was requested by Pat Spence  The study was reviewed by a radiologist, which is a physician who specializes in medical imaging  The radiologist issued a report describing his or her findings  In that report there was a finding that the radiologist felt warranted further discussion with your health care provider and that discussion would be beneficial to you  The results were sent to Pat Spence on 2/19/2020  We recommend that you contact Pat Spence at [phone] or set up an appointment to discuss the results of the imaging test  If you have already heard from Pat Spence regarding the results of your study, you can disregard this letter  This letter is not meant to alarm you, but intended to encourage you to follow-up on your results with the provider that sent you for the imaging study  In addition, we have enclosed answers to frequently asked questions by other patients who have also received a letter to review results with their health care provider (see page two)  Thank you for choosing [facility] for your medical imaging needs  FREQUENTLY ASKED QUESTIONS    1  Why am I receiving this letter? [facility] requires us to notify patients who have findings on imaging exams that may require more testing or follow-up with a health professional within the next 3 months          2  How serious is the finding on the imaging test?  This letter is sent to all patients who may need follow-up or more testing within the next 3 months  Receiving this letter does not necessarily mean you have a life-threatening imaging finding or disease  Recommendations in the radiologists imaging report are general in nature and it is up to your healthcare provider to say whether those recommendations make sense for your situation  You are strongly encouraged to talk to your health care provider about the results and ask whether additional steps need to be taken  3  Where can I get a copy of the final report for my recent radiology exam?  To get a full copy of the report you can access your records online at [email or web address] or please contact [address] [place] [address] Records Department at [phone] Monday through Friday between 8 am and 6 pm          4  What do I need to do now? Please contact your health care provider who requested the imaging study to discuss what further actions (if any) are needed  You may have already heard from (your ordering provider) in regard to this test in which case you can disregard this letter  NOTICE IN ACCORDANCE WITH THE [facility] PATIENT TEST RESULT INFORMATION ACT OF 2018    You are receiving this notice as a result of a determination by your diagnostic imaging service that further discussions of your test results are warranted and would be beneficial to you  The complete results of your test or tests have been or will be sent to the health care practitioner that ordered the test or tests  It is recommended that you contact your health care practitioner to discuss your results as soon as possible

## 2020-02-19 ENCOUNTER — TELEPHONE (OUTPATIENT)
Dept: INTERNAL MEDICINE CLINIC | Facility: CLINIC | Age: 65
End: 2020-02-19

## 2020-02-19 NOTE — TELEPHONE ENCOUNTER
Nani York- called from [facility] about patient's ultrasound done on 2/14  It has significant findings Please advise       Questions: Call the reading room at BD#[phone] or the doctor who she said their name and number is on the report

## 2020-02-20 DIAGNOSIS — N28.1 CYST OF RIGHT KIDNEY: Primary | ICD-10-CM

## 2020-02-20 NOTE — TELEPHONE ENCOUNTER
Patient ultrasound of the kidney is abnormal with a complex cyst she needs an MRI renal protocol done with a follow-up appointment

## 2020-02-20 NOTE — PROGRESS NOTES
Staff was asked to call patient she has a complex cyst of the right kidney MRI of the kidney was ordered for characterization follow-up appointment recommended with me or Urology

## 2020-02-24 DIAGNOSIS — N28.1 CYST OF RIGHT KIDNEY: Primary | ICD-10-CM

## 2020-03-05 ENCOUNTER — HOSPITAL ENCOUNTER (OUTPATIENT)
Dept: MRI IMAGING | Facility: HOSPITAL | Age: 65
Discharge: HOME | End: 2020-03-05
Attending: INTERNAL MEDICINE
Payer: MEDICARE

## 2020-03-05 DIAGNOSIS — N28.1 CYST OF RIGHT KIDNEY: ICD-10-CM

## 2020-03-05 PROCEDURE — A9585 GADOBUTROL INJECTION: HCPCS | Performed by: INTERNAL MEDICINE

## 2020-03-05 PROCEDURE — 74183 MRI ABD W/O CNTR FLWD CNTR: CPT

## 2020-03-05 RX ADMIN — GADOBUTROL 6 ML: 604.72 INJECTION INTRAVENOUS at 09:48

## 2020-03-10 ENCOUNTER — OFFICE VISIT (OUTPATIENT)
Dept: INTERNAL MEDICINE CLINIC | Facility: CLINIC | Age: 65
End: 2020-03-10
Payer: MEDICARE

## 2020-03-10 VITALS
HEART RATE: 88 BPM | SYSTOLIC BLOOD PRESSURE: 145 MMHG | WEIGHT: 148 LBS | HEIGHT: 61 IN | RESPIRATION RATE: 16 BRPM | TEMPERATURE: 96.4 F | DIASTOLIC BLOOD PRESSURE: 80 MMHG | BODY MASS INDEX: 27.94 KG/M2 | OXYGEN SATURATION: 99 %

## 2020-03-10 DIAGNOSIS — R73.03 PREDIABETES: Primary | ICD-10-CM

## 2020-03-10 DIAGNOSIS — R03.0 ELEVATED BLOOD PRESSURE READING: ICD-10-CM

## 2020-03-10 DIAGNOSIS — I10 HYPERTENSION, UNSPECIFIED TYPE: ICD-10-CM

## 2020-03-10 DIAGNOSIS — R03.0 ELEVATED BLOOD PRESSURE READING WITHOUT DIAGNOSIS OF HYPERTENSION: ICD-10-CM

## 2020-03-10 DIAGNOSIS — E78.5 HYPERLIPIDEMIA, UNSPECIFIED HYPERLIPIDEMIA TYPE: ICD-10-CM

## 2020-03-10 DIAGNOSIS — N28.1 CYST OF RIGHT KIDNEY: ICD-10-CM

## 2020-03-10 DIAGNOSIS — H81.13 BENIGN PAROXYSMAL POSITIONAL VERTIGO DUE TO BILATERAL VESTIBULAR DISORDER: ICD-10-CM

## 2020-03-10 DIAGNOSIS — M85.80 OSTEOPENIA, UNSPECIFIED LOCATION: ICD-10-CM

## 2020-03-10 DIAGNOSIS — R73.01 IMPAIRED FASTING GLUCOSE: ICD-10-CM

## 2020-03-10 PROCEDURE — 3079F DIAST BP 80-89 MM HG: CPT | Performed by: INTERNAL MEDICINE

## 2020-03-10 PROCEDURE — 99214 OFFICE O/P EST MOD 30 MIN: CPT | Performed by: INTERNAL MEDICINE

## 2020-03-10 PROCEDURE — 3077F SYST BP >= 140 MM HG: CPT | Performed by: INTERNAL MEDICINE

## 2020-03-10 PROCEDURE — 4040F PNEUMOC VAC/ADMIN/RCVD: CPT | Performed by: INTERNAL MEDICINE

## 2020-03-10 PROCEDURE — 1036F TOBACCO NON-USER: CPT | Performed by: INTERNAL MEDICINE

## 2020-03-10 PROCEDURE — 1160F RVW MEDS BY RX/DR IN RCRD: CPT | Performed by: INTERNAL MEDICINE

## 2020-03-10 PROCEDURE — 3008F BODY MASS INDEX DOCD: CPT | Performed by: INTERNAL MEDICINE

## 2020-03-10 RX ORDER — AMLODIPINE BESYLATE 2.5 MG/1
5 TABLET ORAL DAILY
Qty: 30 TABLET | Refills: 5 | Status: SHIPPED | OUTPATIENT
Start: 2020-03-10 | End: 2020-09-21 | Stop reason: SDUPTHER

## 2020-03-10 RX ORDER — AMLODIPINE BESYLATE 2.5 MG/1
2.5 TABLET ORAL DAILY
Qty: 30 TABLET | Refills: 5 | Status: SHIPPED | OUTPATIENT
Start: 2020-03-10 | End: 2020-08-31

## 2020-03-10 NOTE — PROGRESS NOTES
Assessment/Plan: orestes    1 patient with hypertension will try amlodipine 2 5 mg once a day patient will be monitoring her blood pressure at home will bring her blood pressure monitor here to the office to be sure of accuracy blood pressure goal less than 130/80 will recheck in 6 months  2  Complex renal cyst MRI with contrast shows no further follow-up is indicated no evidence of malignancy  3  Patient with dilated biliary duct is normal for history of cholecystectomy  4  Patient with hyperlipidemia with coronary calcium score of 0 will continue diet lifestyle will be rechecking in 1 year           Diagnoses and all orders for this visit:    Prediabetes    Osteopenia, unspecified location    Hyperlipidemia, unspecified hyperlipidemia type    Cyst of right kidney 2 3 x 2 6 x 2 4 cm complex lesion right kidney    Benign paroxysmal positional vertigo due to bilateral vestibular disorder    Elevated blood pressure reading    Elevated blood pressure reading without diagnosis of hypertension    Hypertension, unspecified type  -     amLODIPine (NORVASC) 2 5 mg tablet; Take 2 tablets (5 mg total) by mouth daily  -     amLODIPine (NORVASC) 2 5 mg tablet; Take 1 tablet (2 5 mg total) by mouth daily    Impaired fasting glucose        The patient was counseled regarding instructions for management, risk factor reductions, patient and family education,impressions, risks and benefits of treatment options, side effects of medications, importance of compliance with treatment  The treatment plan was reviewed with the patient/guardian and patient/guardian understands and agrees with the treatment plan              Current Outpatient Medications:     acetaminophen (TYLENOL) 500 mg tablet, Take 2 tablets by mouth 3 (three) times a day, Disp: , Rfl:     cholecalciferol (VITAMIN D3) 1,000 units tablet, Take by mouth, Disp: , Rfl:     Chromium 1 MG CAPS, Take by mouth, Disp: , Rfl:     Glucosamine-Chondroitin 750-600 MG TABS, Take 1 tablet by mouth 2 (two) times a day, Disp: , Rfl:     Lactobacillus Rhamnosus, GG, (CVS PROBIOTIC, LACTOBACILLUS, PO), Take by mouth, Disp: , Rfl:     meclizine (ANTIVERT) 25 mg tablet, Take 1 tablet (25 mg total) by mouth 3 (three) times a day as needed for dizziness, Disp: 30 tablet, Rfl: 1    Multiple Vitamins-Calcium (TGT DAILY MULTIVITAMIN WOMENS PO), Take by mouth, Disp: , Rfl:     Omega-3 Fatty Acids (FISH OIL) 645 MG CAPS, Take by mouth, Disp: , Rfl:     amLODIPine (NORVASC) 2 5 mg tablet, Take 2 tablets (5 mg total) by mouth daily, Disp: 30 tablet, Rfl: 5    amLODIPine (NORVASC) 2 5 mg tablet, Take 1 tablet (2 5 mg total) by mouth daily, Disp: 30 tablet, Rfl: 5    Subjective:      Patient ID: Mariela Jacinto is a 75 y o  female  Tolerating medications, bp at home 140s-150s over 80s      The following portions of the patient's history were reviewed and updated as appropriate:   She has a past medical history of Gallbladder disease  ,  does not have any pertinent problems on file  ,   has a past surgical history that includes Cervical spine surgery and Cholecystectomy (1987)  ,  family history includes Hyperlipidemia in her father and mother; Osteoarthritis in her family; Other in her child, mother, and sister  ,   reports that she quit smoking about 38 years ago  She has never used smokeless tobacco  She reports that she does not drink alcohol or use drugs  ,  is allergic to other; pollen extract; and macrobid [nitrofurantoin]       Review of Systems   Constitutional: Negative for appetite change, chills, fatigue, fever and unexpected weight change  HENT: Negative for congestion, ear pain, facial swelling, hearing loss, mouth sores, nosebleeds, postnasal drip, rhinorrhea, sinus pain, sore throat, trouble swallowing and voice change  Eyes: Negative for pain, discharge, redness and visual disturbance     Respiratory: Negative for apnea, chest tightness, shortness of breath, wheezing and stridor  Cardiovascular: Negative for chest pain, palpitations and leg swelling  Gastrointestinal: Negative for abdominal distention, abdominal pain, blood in stool, constipation, diarrhea and vomiting  Endocrine: Negative for cold intolerance, heat intolerance, polydipsia, polyphagia and polyuria  Genitourinary: Negative for difficulty urinating, dysuria, flank pain, frequency, genital sores, hematuria and urgency  Musculoskeletal: Negative for arthralgias and back pain  Skin: Negative for rash and wound  Allergic/Immunologic: Negative for environmental allergies, food allergies and immunocompromised state  Neurological: Negative for dizziness, tremors, seizures, syncope, facial asymmetry, speech difficulty, weakness, light-headedness, numbness and headaches  Hematological: Negative for adenopathy  Does not bruise/bleed easily  Psychiatric/Behavioral: Negative for agitation, behavioral problems, dysphoric mood, hallucinations, self-injury, sleep disturbance and suicidal ideas  The patient is not hyperactive            Objective:  /80 (BP Location: Left arm, Patient Position: Sitting)   Pulse 88   Temp (!) 96 4 °F (35 8 °C)   Resp 16   Ht 5' 1" (1 549 m)   Wt 67 1 kg (148 lb)   SpO2 99%   BMI 27 96 kg/m²     Lab Review  Appointment on 01/08/2020   Component Date Value    WBC 01/08/2020 7 59     RBC 01/08/2020 4 96     Hemoglobin 01/08/2020 13 3     Hematocrit 01/08/2020 43 1     MCV 01/08/2020 87     MCH 01/08/2020 26 8     MCHC 01/08/2020 30 9*    RDW 01/08/2020 13 6     MPV 01/08/2020 12 0     Platelets 21/68/5739 235     nRBC 01/08/2020 0     Neutrophils Relative 01/08/2020 48     Immat GRANS % 01/08/2020 0     Lymphocytes Relative 01/08/2020 42     Monocytes Relative 01/08/2020 7     Eosinophils Relative 01/08/2020 2     Basophils Relative 01/08/2020 1     Neutrophils Absolute 01/08/2020 3 68     Immature Grans Absolute 01/08/2020 0 02     Lymphocytes Absolute 01/08/2020 3 20     Monocytes Absolute 01/08/2020 0 51     Eosinophils Absolute 01/08/2020 0 14     Basophils Absolute 01/08/2020 0 04     Sodium 01/08/2020 140     Potassium 01/08/2020 4 4     Chloride 01/08/2020 104     CO2 01/08/2020 28     ANION GAP 01/08/2020 8     BUN 01/08/2020 13     Creatinine 01/08/2020 0 73     Glucose, Fasting 01/08/2020 103*    Calcium 01/08/2020 9 0     AST 01/08/2020 21     ALT 01/08/2020 22     Alkaline Phosphatase 01/08/2020 79     Total Protein 01/08/2020 7 2     Albumin 01/08/2020 3 6     Total Bilirubin 01/08/2020 0 60     eGFR 01/08/2020 86     Hemoglobin A1C 01/08/2020 5 7     EAG 01/08/2020 117     LDL Direct 01/08/2020 169*    Cholesterol 01/08/2020 269*    Triglycerides 01/08/2020 108     HDL, Direct 01/08/2020 64     LDL Calculated 01/08/2020 183*    Non-HDL-Chol (CHOL-HDL) 01/08/2020 205     TSH 3RD GENERATON 01/08/2020 2 549     Creatinine, Ur 01/08/2020 93 4     Microalbum  ,U,Random 01/08/2020 15 4     Microalb Creat Ratio 01/08/2020 16          Imaging  @[email or web address]@     No orders to display     No results found for this or any previous visit  Physical Exam   Constitutional: She is oriented to person, place, and time  She appears well-developed  HENT:   Right Ear: External ear normal    Left Ear: External ear normal    Eyes: Right eye exhibits no discharge  Left eye exhibits no discharge  No scleral icterus  Neck: Carotid bruit is not present  No tracheal deviation present  No thyroid mass and no thyromegaly present  Cardiovascular: Normal rate, regular rhythm, normal heart sounds and intact distal pulses  Exam reveals no gallop and no friction rub  No murmur heard  Pulmonary/Chest: No respiratory distress  She has no wheezes  She has no rales  Musculoskeletal: She exhibits no edema  Lymphadenopathy:     She has no cervical adenopathy     Neurological: She is alert and oriented to person, place, and time  Coordination normal    Psychiatric: She has a normal mood and affect  Her behavior is normal  Judgment and thought content normal    Nursing note and vitals reviewed

## 2020-03-10 NOTE — PATIENT INSTRUCTIONS
1 patient with hypertension will try amlodipine 2 5 mg once a day patient will be monitoring her blood pressure at home will bring her blood pressure monitor here to the office to be sure of accuracy blood pressure goal less than 130/80 will recheck in 6 months  2  Complex renal cyst MRI with contrast shows no further follow-up is indicated no evidence of malignancy  3  Patient with dilated biliary duct is normal for history of cholecystectomy  4  Patient with hyperlipidemia with coronary calcium score of 0 will continue diet lifestyle will be rechecking in 1 year    Amlodipine (By mouth)   Amlodipine ([id number])  Treats high blood pressure and angina (chest pain)  This medicine is a calcium channel blocker  Brand Name(s): Norvasc   There may be other brand names for this medicine  When This Medicine Should Not Be Used: This medicine is not right for everyone  Do not use it if you had an allergic reaction to amlodipine  How to Use This Medicine:   Tablet, Dissolving Tablet  · Take your medicine as directed  Your dose may need to be changed several times to find what works best for you  Take this medicine at the same time each day  · Read and follow the patient instructions that come with this medicine  Talk to your doctor or pharmacist if you have any questions  · Missed dose: Take a dose as soon as you remember  If it has been more than 12 hours since you were supposed to take your dose, skip the missed dose and take your next regular dose at the regular time  · Store the medicine in a closed container at room temperature, away from heat, moisture, and direct light  Drugs and Foods to Avoid:   Ask your doctor or pharmacist before using any other medicine, including over-the-counter medicines, vitamins, and herbal products  · Some medicines can affect how amlodipine works   Tell your doctor if you are also using any of the following:   ¨ Clarithromycin, cyclosporine, diltiazem, itraconazole, ritonavir, sildenafil, simvastatin, tacrolimus  Warnings While Using This Medicine:   · Tell your doctor if you are pregnant or breastfeeding, or if you have liver disease, heart disease, coronary artery disease, or aortic stenosis  · This medicine could lower your blood pressure too much, especially when you first use it or if you are dehydrated  Stand or sit up slowly if you feel lightheaded or dizzy  · Your doctor will check your progress and the effects of this medicine at regular visits  Keep all appointments  · Do not stop using this medicine without asking your doctor, even if you feel well  This medicine will not cure high blood pressure, but it will help keep it in normal range  You may have to take blood pressure medicine for the rest of your life  · Keep all medicine out of the reach of children  Never share your medicine with anyone  Possible Side Effects While Using This Medicine:   Call your doctor right away if you notice any of these side effects:  · Allergic reaction: Itching or hives, swelling in your face or hands, swelling or tingling in your mouth or throat, chest tightness, trouble breathing  · Lightheadedness, dizziness  · New or worsening chest pain  · Swelling in your hands, ankles, or legs  · Trouble breathing, nausea, unusual sweating, fainting  If you notice other side effects that you think are caused by this medicine, tell your doctor  Call your doctor for medical advice about side effects  You may report side effects to FDA at 3-718-FDA-1957  © 2017 [employer] Information is for End User's use only and may not be sold, redistributed or otherwise used for commercial purposes  The above information is an  only  It is not intended as medical advice for individual conditions or treatments  Talk to your doctor, nurse or pharmacist before following any medical regimen to see if it is safe and effective for you

## 2020-08-30 DIAGNOSIS — I10 HYPERTENSION, UNSPECIFIED TYPE: ICD-10-CM

## 2020-08-31 RX ORDER — AMLODIPINE BESYLATE 2.5 MG/1
TABLET ORAL
Qty: 90 TABLET | Refills: 1 | Status: SHIPPED | OUTPATIENT
Start: 2020-08-31 | End: 2020-09-21 | Stop reason: SDUPTHER

## 2020-09-21 ENCOUNTER — OFFICE VISIT (OUTPATIENT)
Dept: INTERNAL MEDICINE CLINIC | Facility: CLINIC | Age: 65
End: 2020-09-21
Payer: MEDICARE

## 2020-09-21 VITALS
DIASTOLIC BLOOD PRESSURE: 70 MMHG | TEMPERATURE: 97.6 F | OXYGEN SATURATION: 98 % | WEIGHT: 151.2 LBS | RESPIRATION RATE: 16 BRPM | BODY MASS INDEX: 28.55 KG/M2 | HEIGHT: 61 IN | SYSTOLIC BLOOD PRESSURE: 118 MMHG | HEART RATE: 83 BPM

## 2020-09-21 DIAGNOSIS — R73.03 PREDIABETES: ICD-10-CM

## 2020-09-21 DIAGNOSIS — E78.5 HYPERLIPIDEMIA, UNSPECIFIED HYPERLIPIDEMIA TYPE: ICD-10-CM

## 2020-09-21 DIAGNOSIS — T63.441A BEE STING REACTION, ACCIDENTAL OR UNINTENTIONAL, INITIAL ENCOUNTER: ICD-10-CM

## 2020-09-21 DIAGNOSIS — Z23 NEED FOR IMMUNIZATION AGAINST INFLUENZA: Primary | ICD-10-CM

## 2020-09-21 DIAGNOSIS — N28.1 CYST OF RIGHT KIDNEY: ICD-10-CM

## 2020-09-21 DIAGNOSIS — I10 ESSENTIAL HYPERTENSION: ICD-10-CM

## 2020-09-21 DIAGNOSIS — I10 HYPERTENSION, UNSPECIFIED TYPE: ICD-10-CM

## 2020-09-21 DIAGNOSIS — Z00.00 MEDICARE ANNUAL WELLNESS VISIT, SUBSEQUENT: ICD-10-CM

## 2020-09-21 PROBLEM — R03.0 ELEVATED BLOOD PRESSURE READING: Status: RESOLVED | Noted: 2017-12-13 | Resolved: 2020-09-21

## 2020-09-21 PROBLEM — R03.0 ELEVATED BLOOD PRESSURE READING WITHOUT DIAGNOSIS OF HYPERTENSION: Status: RESOLVED | Noted: 2019-04-08 | Resolved: 2020-09-21

## 2020-09-21 PROCEDURE — 90471 IMMUNIZATION ADMIN: CPT | Performed by: NURSE PRACTITIONER

## 2020-09-21 PROCEDURE — G0008 ADMIN INFLUENZA VIRUS VAC: HCPCS | Performed by: NURSE PRACTITIONER

## 2020-09-21 PROCEDURE — 90662 IIV NO PRSV INCREASED AG IM: CPT | Performed by: NURSE PRACTITIONER

## 2020-09-21 PROCEDURE — 99214 OFFICE O/P EST MOD 30 MIN: CPT | Performed by: NURSE PRACTITIONER

## 2020-09-21 PROCEDURE — 90715 TDAP VACCINE 7 YRS/> IM: CPT | Performed by: NURSE PRACTITIONER

## 2020-09-21 PROCEDURE — G0438 PPPS, INITIAL VISIT: HCPCS | Performed by: NURSE PRACTITIONER

## 2020-09-21 RX ORDER — AMLODIPINE BESYLATE 2.5 MG/1
2.5 TABLET ORAL DAILY
Qty: 90 TABLET | Refills: 2 | Status: SHIPPED | OUTPATIENT
Start: 2020-09-21 | End: 2021-07-19

## 2020-09-21 NOTE — PROGRESS NOTES
Assessment/Plan:    1  Hypertension at goal, continue Amlodipine  2  Hyperlipidemia- Due for lipid panel  3  Prediabetes- Will have A1c drawn with other labs  4  Complex renal cyst- MRI with contrast shows no further work up needed  5  Medicare wellness completed  Follow up in six months  Diagnoses and all orders for this visit:    Need for immunization against influenza  -     influenza vaccine, high-dose, PF 0 7 mL (FLUZONE HIGH-DOSE)    Bee sting reaction, accidental or unintentional, initial encounter  -     TDAP VACCINE GREATER THAN OR EQUAL TO 6YO IM    Medicare annual wellness visit, subsequent      The patient was counseled regarding instructions for management, risk factor reductions, patient and family education,impressions, risks and benefits of treatment options, side effects of medications, importance of compliance with treatment  The treatment plan was reviewed with the patient/guardian and patient/guardian understands and agrees with the treatment plan          Current Outpatient Medications:     acetaminophen (TYLENOL) 500 mg tablet, Take 2 tablets by mouth 3 (three) times a day, Disp: , Rfl:     amLODIPine (NORVASC) 2 5 mg tablet, Take 2 tablets (5 mg total) by mouth daily, Disp: 30 tablet, Rfl: 5    cholecalciferol (VITAMIN D3) 1,000 units tablet, Take by mouth, Disp: , Rfl:     Chromium 1 MG CAPS, Take by mouth, Disp: , Rfl:     Glucosamine-Chondroitin 750-600 MG TABS, Take 1 tablet by mouth 2 (two) times a day, Disp: , Rfl:     Multiple Vitamins-Calcium (TGT DAILY MULTIVITAMIN WOMENS PO), Take by mouth, Disp: , Rfl:     Omega-3 Fatty Acids (FISH OIL) 645 MG CAPS, Take by mouth, Disp: , Rfl:     Lactobacillus Rhamnosus, GG, (CVS PROBIOTIC, LACTOBACILLUS, PO), Take by mouth, Disp: , Rfl:     meclizine (ANTIVERT) 25 mg tablet, Take 1 tablet (25 mg total) by mouth 3 (three) times a day as needed for dizziness (Patient not taking: Reported on 9/21/2020), Disp: 30 tablet, Rfl: 1    Subjective:      Patient ID: Minoo Solis is a 77 y o  female  Here for follow up  Has not       The following portions of the patient's history were reviewed and updated as appropriate:   She has a past medical history of Gallbladder disease  ,  does not have any pertinent problems on file  ,   has a past surgical history that includes Cervical spine surgery and Cholecystectomy (1987)  ,  family history includes Hyperlipidemia in her father and mother; Osteoarthritis in her family; Other in her child, mother, and sister  ,   reports that she quit smoking about 39 years ago  She has never used smokeless tobacco  She reports that she does not drink alcohol or use drugs  ,  is allergic to other; pollen extract; and macrobid [nitrofurantoin]       Review of Systems   Constitutional: Negative  Respiratory: Negative  Cardiovascular: Negative  Musculoskeletal: Negative  Psychiatric/Behavioral: Negative  Objective:  /70 (BP Location: Left arm, Patient Position: Sitting, Cuff Size: Standard)   Pulse 83   Temp 97 6 °F (36 4 °C) (Tympanic)   Resp 16   Ht 5' 1" (1 549 m)   Wt 68 6 kg (151 lb 3 2 oz)   SpO2 98%   BMI 28 57 kg/m²     Lab Review  No visits with results within 2 Month(s) from this visit     Latest known visit with results is:   Appointment on 01/08/2020   Component Date Value    WBC 01/08/2020 7 59     RBC 01/08/2020 4 96     Hemoglobin 01/08/2020 13 3     Hematocrit 01/08/2020 43 1     MCV 01/08/2020 87     MCH 01/08/2020 26 8     MCHC 01/08/2020 30 9*    RDW 01/08/2020 13 6     MPV 01/08/2020 12 0     Platelets 45/61/4025 235     nRBC 01/08/2020 0     Neutrophils Relative 01/08/2020 48     Immat GRANS % 01/08/2020 0     Lymphocytes Relative 01/08/2020 42     Monocytes Relative 01/08/2020 7     Eosinophils Relative 01/08/2020 2     Basophils Relative 01/08/2020 1     Neutrophils Absolute 01/08/2020 3 68     Immature Grans Absolute 01/08/2020 0 02  Lymphocytes Absolute 01/08/2020 3 20     Monocytes Absolute 01/08/2020 0 51     Eosinophils Absolute 01/08/2020 0 14     Basophils Absolute 01/08/2020 0 04     Sodium 01/08/2020 140     Potassium 01/08/2020 4 4     Chloride 01/08/2020 104     CO2 01/08/2020 28     ANION GAP 01/08/2020 8     BUN 01/08/2020 13     Creatinine 01/08/2020 0 73     Glucose, Fasting 01/08/2020 103*    Calcium 01/08/2020 9 0     AST 01/08/2020 21     ALT 01/08/2020 22     Alkaline Phosphatase 01/08/2020 79     Total Protein 01/08/2020 7 2     Albumin 01/08/2020 3 6     Total Bilirubin 01/08/2020 0 60     eGFR 01/08/2020 86     Hemoglobin A1C 01/08/2020 5 7     EAG 01/08/2020 117     LDL Direct 01/08/2020 169*    Cholesterol 01/08/2020 269*    Triglycerides 01/08/2020 108     HDL, Direct 01/08/2020 64     LDL Calculated 01/08/2020 183*    Non-HDL-Chol (CHOL-HDL) 01/08/2020 205     TSH 3RD GENERATON 01/08/2020 2 549     Creatinine, Ur 01/08/2020 93 4     Microalbum  ,U,Random 01/08/2020 15 4     Microalb Creat Ratio 01/08/2020 16         Imaging: No results found  Physical Exam  Constitutional:       Appearance: She is well-developed  Cardiovascular:      Rate and Rhythm: Normal rate and regular rhythm  Heart sounds: Normal heart sounds  Pulmonary:      Effort: Pulmonary effort is normal       Breath sounds: Normal breath sounds  Musculoskeletal: Normal range of motion  Neurological:      Mental Status: She is alert and oriented to person, place, and time  Deep Tendon Reflexes: Reflexes are normal and symmetric  Psychiatric:         Behavior: Behavior normal          Thought Content:  Thought content normal          Judgment: Judgment normal

## 2020-09-21 NOTE — PROGRESS NOTES
Assessment and Plan:     Problem List Items Addressed This Visit        Other    Medicare annual wellness visit, subsequent      Other Visit Diagnoses     Need for immunization against influenza    -  Primary    Relevant Orders    influenza vaccine, high-dose, PF 0 7 mL (FLUZONE HIGH-DOSE) (Completed)    Bee sting reaction, accidental or unintentional, initial encounter        Relevant Orders    TDAP VACCINE GREATER THAN OR EQUAL TO 8YO IM           Preventive health issues were discussed with patient, and age appropriate screening tests were ordered as noted in patient's After Visit Summary  Personalized health advice and appropriate referrals for health education or preventive services given if needed, as noted in patient's After Visit Summary       History of Present Illness:     Patient presents for Medicare Annual Wellness visit    Patient Care Team:  Huong Steele DO as PCP - General     Problem List:     Patient Active Problem List   Diagnosis    Benign positional vertigo    Chondromalacia patellae    Elevated blood pressure reading    Elevated HDL    Hay fever    Hyperlipidemia    Impaired fasting glucose    Osteoarthritis of knee    Osteopenia    Prediabetes    Varicose veins    Need for hepatitis C screening test    Need for shingles vaccine    Anxiety    Elevated blood pressure reading without diagnosis of hypertension    Need for meningococcal vaccination    Encounter for screening mammogram for breast cancer    Cyst of right kidney 2 3 x 2 6 x 2 4 cm complex lesion right kidney MRI constrast benign    Medicare annual wellness visit, subsequent      Past Medical and Surgical History:     Past Medical History:   Diagnosis Date    Gallbladder disease      Past Surgical History:   Procedure Laterality Date    CERVICAL SPINE SURGERY      CHOLECYSTECTOMY  1987      Family History:     Family History   Problem Relation Age of Onset    Other Mother         Coronary Artery Surgery    Hyperlipidemia Mother     Hyperlipidemia Father     Other Sister         Hay fever    Osteoarthritis Family     Other Child         Hay fever      Social History:        Social History     Socioeconomic History    Marital status: /Civil Union     Spouse name: None    Number of children: None    Years of education: None    Highest education level: None   Occupational History    None   Social Needs    Financial resource strain: None    Food insecurity     Worry: None     Inability: None    Transportation needs     Medical: None     Non-medical: None   Tobacco Use    Smoking status: Former Smoker     Last attempt to quit: 1981     Years since quittin 4    Smokeless tobacco: Never Used    Tobacco comment: Former Smoker-Quit smoking    Substance and Sexual Activity    Alcohol use: No     Comment: Per allscripts social    Drug use: No    Sexual activity: Never   Lifestyle    Physical activity     Days per week: None     Minutes per session: None    Stress: None   Relationships    Social connections     Talks on phone: None     Gets together: None     Attends Congregation service: None     Active member of club or organization: None     Attends meetings of clubs or organizations: None     Relationship status: None    Intimate partner violence     Fear of current or ex partner: None     Emotionally abused: None     Physically abused: None     Forced sexual activity: None   Other Topics Concern    None   Social History Narrative    Daily coffee consumption      Medications and Allergies:     Current Outpatient Medications   Medication Sig Dispense Refill    acetaminophen (TYLENOL) 500 mg tablet Take 2 tablets by mouth 3 (three) times a day      amLODIPine (NORVASC) 2 5 mg tablet Take 2 tablets (5 mg total) by mouth daily 30 tablet 5    cholecalciferol (VITAMIN D3) 1,000 units tablet Take by mouth      Chromium 1 MG CAPS Take by mouth      Glucosamine-Chondroitin 750-600 MG TABS Take 1 tablet by mouth 2 (two) times a day      Multiple Vitamins-Calcium (TGT DAILY MULTIVITAMIN WOMENS PO) Take by mouth      Omega-3 Fatty Acids (FISH OIL) 645 MG CAPS Take by mouth      Lactobacillus Rhamnosus, GG, ([employer] PROBIOTIC, LACTOBACILLUS, PO) Take by mouth      meclizine (ANTIVERT) 25 mg tablet Take 1 tablet (25 mg total) by mouth 3 (three) times a day as needed for dizziness (Patient not taking: Reported on 9/21/2020) 30 tablet 1     No current facility-administered medications for this visit  Allergies   Allergen Reactions    Other     Pollen Extract     Macrobid [Nitrofurantoin] Rash      Immunizations:     Immunization History   Administered Date(s) Administered    INFLUENZA 12/11/2006, 10/30/2007, 10/21/2008, 10/20/2009, 10/13/2010, 10/16/2014, 10/02/2015, 10/15/2015, 09/07/2016, 11/06/2017, 12/01/2018, 12/31/2018, 10/21/2019    Influenza Split High Dose Preservative Free IM 09/07/2016    Influenza TIV (IM) 10/02/2015    Influenza, high dose seasonal 0 7 mL 09/21/2020    Meningococcal MCV4P 05/01/2019    Pneumococcal Conjugate 13-Valent 05/01/2018    Pneumococcal Polysaccharide PPV23 01/21/2020      Health Maintenance:         Topic Date Due    MAMMOGRAM  12/21/2020    Hepatitis C Screening  Completed         Topic Date Due    DTaP,Tdap,and Td Vaccines (1 - Tdap) 03/28/1974    Influenza Vaccine  07/01/2020      Medicare Health Risk Assessment:     /70 (BP Location: Left arm, Patient Position: Sitting, Cuff Size: Standard)   Pulse 83   Temp 97 6 °F (36 4 °C) (Tympanic)   Resp 16   Ht 5' 1" (1 549 m)   Wt 68 6 kg (151 lb 3 2 oz)   SpO2 98%   BMI 28 57 kg/m²      Lucho Flores is here for her Subsequent Wellness visit  Health Risk Assessment:   Patient rates overall health as good  Patient feels that their physical health rating is same  Eyesight was rated as same  Hearing was rated as same  Patient feels that their emotional and mental health rating is same   Pain experienced in the last 7 days has been none  Patient states that she has experienced no weight loss or gain in last 6 months  Depression Screening:   PHQ-2 Score: 0      Fall Risk Screening: In the past year, patient has experienced: no history of falling in past year      Urinary Incontinence Screening:   Patient has not leaked urine accidently in the last six months  Home Safety:  Patient does not have trouble with stairs inside or outside of their home  Patient has working smoke alarms and has working carbon monoxide detector  Home safety hazards include: none  Nutrition:   Current diet is Regular  Medications:   Patient is currently taking over-the-counter supplements  OTC medications include: see medication list  Patient is able to manage medications  Activities of Daily Living (ADLs)/Instrumental Activities of Daily Living (IADLs):   Walk and transfer into and out of bed and chair?: Yes  Dress and groom yourself?: Yes    Bathe or shower yourself?: Yes    Feed yourself?  Yes  Do your laundry/housekeeping?: Yes  Manage your money, pay your bills and track your expenses?: Yes  Make your own meals?: Yes    Do your own shopping?: Yes    Previous Hospitalizations:   Any hospitalizations or ED visits within the last 12 months?: No      Advance Care Planning:   Living will: Yes    Advanced directive: Yes      PREVENTIVE SCREENINGS      Cardiovascular Screening:    General: Screening Not Indicated and History Lipid Disorder      Diabetes Screening:     General: Screening Current      Colorectal Cancer Screening:     General: Screening Current      Breast Cancer Screening:     General: Screening Current      Cervical Cancer Screening:    General: Screening Not Indicated      Lung Cancer Screening:     General: Screening Not Indicated      Hepatitis C Screening:    General: Screening Current      OMID Spence

## 2020-09-21 NOTE — PATIENT INSTRUCTIONS
Medicare Preventive Visit Patient Instructions  Thank you for completing your Welcome to Medicare Visit or Medicare Annual Wellness Visit today  Your next wellness visit will be due in one year (9/21/2021)  The screening/preventive services that you may require over the next 5-10 years are detailed below  Some tests may not apply to you based off risk factors and/or age  Screening tests ordered at today's visit but not completed yet may show as past due  Also, please note that scanned in results may not display below  Preventive Screenings:  Service Recommendations Previous Testing/Comments   Colorectal Cancer Screening  * Colonoscopy    * Fecal Occult Blood Test (FOBT)/Fecal Immunochemical Test (FIT)  * Fecal DNA/Cologuard Test  * Flexible Sigmoidoscopy Age: 54-65 years old   Colonoscopy: every 10 years (may be performed more frequently if at higher risk)  OR  FOBT/FIT: every 1 year  OR  Cologuard: every 3 years  OR  Sigmoidoscopy: every 5 years  Screening may be recommended earlier than age 48 if at higher risk for colorectal cancer  Also, an individualized decision between you and your healthcare provider will decide whether screening between the ages of 74-80 would be appropriate  Colonoscopy: 04/10/2019  FOBT/FIT: Not on file  Cologuard: Not on file  Sigmoidoscopy: Not on file    Screening Current     Breast Cancer Screening Age: 36 years old  Frequency: every 1-2 years  Not required if history of left and right mastectomy Mammogram: 12/21/2019    Screening Current   Cervical Cancer Screening Between the ages of 21-29, pap smear recommended once every 3 years  Between the ages of 33-67, can perform pap smear with HPV co-testing every 5 years     Recommendations may differ for women with a history of total hysterectomy, cervical cancer, or abnormal pap smears in past  Pap Smear: Not on file    Screening Not Indicated   Hepatitis C Screening Once for adults born between 1945 and 1965  More frequently in patients at high risk for Hepatitis C Hep C Antibody: 05/08/2019    Screening Current   Diabetes Screening 1-2 times per year if you're at risk for diabetes or have pre-diabetes Fasting glucose: 103 mg/dL   A1C: 5 7 %    Screening Current   Cholesterol Screening Once every 5 years if you don't have a lipid disorder  May order more often based on risk factors  Lipid panel: 01/08/2020    Screening Not Indicated  History Lipid Disorder     Other Preventive Screenings Covered by Medicare:  1  Abdominal Aortic Aneurysm (AAA) Screening: covered once if your at risk  You're considered to be at risk if you have a family history of AAA  2  Lung Cancer Screening: covers low dose CT scan once per year if you meet all of the following conditions: (1) Age 50-69; (2) No signs or symptoms of lung cancer; (3) Current smoker or have quit smoking within the last 15 years; (4) You have a tobacco smoking history of at least 30 pack years (packs per day multiplied by number of years you smoked); (5) You get a written order from a healthcare provider  3  Glaucoma Screening: covered annually if you're considered high risk: (1) You have diabetes OR (2) Family history of glaucoma OR (3)  aged 48 and older OR (3)  American aged 72 and older  3  Osteoporosis Screening: covered every 2 years if you meet one of the following conditions: (1) You're estrogen deficient and at risk for osteoporosis based off medical history and other findings; (2) Have a vertebral abnormality; (3) On glucocorticoid therapy for more than 3 months; (4) Have primary hyperparathyroidism; (5) On osteoporosis medications and need to assess response to drug therapy  · Last bone density test (DXA Scan): 01/09/2020   5  HIV Screening: covered annually if you're between the age of 15-65  Also covered annually if you are younger than 13 and older than 72 with risk factors for HIV infection   For pregnant patients, it is covered up to 3 times per pregnancy  Immunizations:  Immunization Recommendations   Influenza Vaccine Annual influenza vaccination during flu season is recommended for all persons aged >= 6 months who do not have contraindications   Pneumococcal Vaccine (Prevnar and Pneumovax)  * Prevnar = PCV13  * Pneumovax = PPSV23   Adults 25-60 years old: 1-3 doses may be recommended based on certain risk factors  Adults 72 years old: Prevnar (PCV13) vaccine recommended followed by Pneumovax (PPSV23) vaccine  If already received PPSV23 since turning 65, then PCV13 recommended at least one year after PPSV23 dose  Hepatitis B Vaccine 3 dose series if at intermediate or high risk (ex: diabetes, end stage renal disease, liver disease)   Tetanus (Td) Vaccine - COST NOT COVERED BY MEDICARE PART B Following completion of primary series, a booster dose should be given every 10 years to maintain immunity against tetanus  Td may also be given as tetanus wound prophylaxis  Tdap Vaccine - COST NOT COVERED BY MEDICARE PART B Recommended at least once for all adults  For pregnant patients, recommended with each pregnancy  Shingles Vaccine (Shingrix) - COST NOT COVERED BY MEDICARE PART B  2 shot series recommended in those aged 48 and above     Health Maintenance Due:      Topic Date Due    MAMMOGRAM  12/21/2020    Hepatitis C Screening  Completed     Immunizations Due:      Topic Date Due    DTaP,Tdap,and Td Vaccines (1 - Tdap) 03/28/1974    Influenza Vaccine  07/01/2020     Advance Directives   What are advance directives? Advance directives are legal documents that state your wishes and plans for medical care  These plans are made ahead of time in case you lose your ability to make decisions for yourself  Advance directives can apply to any medical decision, such as the treatments you want, and if you want to donate organs  What are the types of advance directives?   There are many types of advance directives, and each state has rules about how to use them  You may choose a combination of any of the following:  · Living will: This is a written record of the treatment you want  You can also choose which treatments you do not want, which to limit, and which to stop at a certain time  This includes surgery, medicine, IV fluid, and tube feedings  · Durable power of  for healthcare [place] [facility]): This is a written record that states who you want to make healthcare choices for you when you are unable to make them for yourself  This person, called a proxy, is usually a family member or a friend  You may choose more than 1 proxy  · Do not resuscitate (DNR) order:  A DNR order is used in case your heart stops beating or you stop breathing  It is a request not to have certain forms of treatment, such as CPR  A DNR order may be included in other types of advance directives  · Medical directive: This covers the care that you want if you are in a coma, near death, or unable to make decisions for yourself  You can list the treatments you want for each condition  Treatment may include pain medicine, surgery, blood transfusions, dialysis, IV or tube feedings, and a ventilator (breathing machine)  · Values history: This document has questions about your views, beliefs, and how you feel and think about life  This information can help others choose the care that you would choose  Why are advance directives important? An advance directive helps you control your care  Although spoken wishes may be used, it is better to have your wishes written down  Spoken wishes can be misunderstood, or not followed  Treatments may be given even if you do not want them  An advance directive may make it easier for your family to make difficult choices about your care  Weight Management   Why it is important to manage your weight:  Being overweight increases your risk of health conditions such as heart disease, high blood pressure, type 2 diabetes, and certain types of cancer   It can also increase your risk for osteoarthritis, sleep apnea, and other respiratory problems  Aim for a slow, steady weight loss  Even a small amount of weight loss can lower your risk of health problems  How to lose weight safely:  A safe and healthy way to lose weight is to eat fewer calories and get regular exercise  You can lose up about 1 pound a week by decreasing the number of calories you eat by 500 calories each day  Healthy meal plan for weight management:  A healthy meal plan includes a variety of foods, contains fewer calories, and helps you stay healthy  A healthy meal plan includes the following:  · Eat whole-grain foods more often  A healthy meal plan should contain fiber  Fiber is the part of grains, fruits, and vegetables that is not broken down by your body  Whole-grain foods are healthy and provide extra fiber in your diet  Some examples of whole-grain foods are whole-wheat breads and pastas, oatmeal, brown rice, and bulgur  · Eat a variety of vegetables every day  Include dark, leafy greens such as spinach, kale, ysabel greens, and mustard greens  Eat yellow and orange vegetables such as carrots, sweet potatoes, and winter squash  · Eat a variety of fruits every day  Choose fresh or canned fruit (canned in its own juice or light syrup) instead of juice  Fruit juice has very little or no fiber  · Eat low-fat dairy foods  Drink fat-free (skim) milk or 1% milk  Eat fat-free yogurt and low-fat cottage cheese  Try low-fat cheeses such as mozzarella and other reduced-fat cheeses  · Choose meat and other protein foods that are low in fat  Choose beans or other legumes such as split peas or lentils  Choose fish, skinless poultry (chicken or turkey), or lean cuts of red meat (beef or pork)  Before you cook meat or poultry, cut off any visible fat  · Use less fat and oil  Try baking foods instead of frying them   Add less fat, such as margarine, sour cream, regular salad dressing and mayonnaise to foods  Eat fewer high-fat foods  Some examples of high-fat foods include french fries, doughnuts, ice cream, and cakes  · Eat fewer sweets  Limit foods and drinks that are high in sugar  This includes candy, cookies, regular soda, and sweetened drinks  Exercise:  Exercise at least 30 minutes per day on most days of the week  Some examples of exercise include walking, biking, dancing, and swimming  You can also fit in more physical activity by taking the stairs instead of the elevator or parking farther away from stores  Ask your healthcare provider about the best exercise plan for you  © Copyright [employer] 2018 Information is for End User's use only and may not be sold, redistributed or otherwise used for commercial purposes  All illustrations and images included in CareNotes® are the copyrighted property of A D A M [employer]  or Lynda Guzman     1  Hypertension at goal, continue Amlodipine  2  Hyperlipidemia- Due for lipid panel  3  Prediabetes- Will have A1c drawn with other labs  4  Complex renal cyst- MRI with contrast shows no further work up needed  5  Medicare wellness completed  Follow up in six months

## 2021-02-17 ENCOUNTER — LAB (OUTPATIENT)
Dept: LAB | Facility: HOSPITAL | Age: 66
End: 2021-02-17
Payer: MEDICARE

## 2021-02-17 DIAGNOSIS — I10 ESSENTIAL HYPERTENSION: ICD-10-CM

## 2021-02-17 DIAGNOSIS — I10 ESSENTIAL HYPERTENSION: Primary | ICD-10-CM

## 2021-02-17 DIAGNOSIS — E78.89 ELEVATED HDL: ICD-10-CM

## 2021-02-17 DIAGNOSIS — R73.03 PREDIABETES: ICD-10-CM

## 2021-02-17 DIAGNOSIS — E78.5 HYPERLIPIDEMIA, UNSPECIFIED HYPERLIPIDEMIA TYPE: ICD-10-CM

## 2021-02-17 DIAGNOSIS — M17.10 OSTEOARTHRITIS OF KNEE, UNSPECIFIED LATERALITY, UNSPECIFIED OSTEOARTHRITIS TYPE: ICD-10-CM

## 2021-02-17 DIAGNOSIS — M85.80 OSTEOPENIA, UNSPECIFIED LOCATION: ICD-10-CM

## 2021-02-17 DIAGNOSIS — R73.01 IMPAIRED FASTING GLUCOSE: ICD-10-CM

## 2021-02-22 ENCOUNTER — OFFICE VISIT (OUTPATIENT)
Dept: INTERNAL MEDICINE CLINIC | Facility: CLINIC | Age: 66
End: 2021-02-22
Payer: MEDICARE

## 2021-02-22 ENCOUNTER — TELEPHONE (OUTPATIENT)
Dept: ADMINISTRATIVE | Facility: OTHER | Age: 66
End: 2021-02-22

## 2021-02-22 VITALS
BODY MASS INDEX: 27.94 KG/M2 | TEMPERATURE: 97.6 F | WEIGHT: 148 LBS | HEART RATE: 87 BPM | RESPIRATION RATE: 16 BRPM | HEIGHT: 61 IN | SYSTOLIC BLOOD PRESSURE: 128 MMHG | OXYGEN SATURATION: 99 % | DIASTOLIC BLOOD PRESSURE: 82 MMHG

## 2021-02-22 DIAGNOSIS — E78.5 HYPERLIPIDEMIA, UNSPECIFIED HYPERLIPIDEMIA TYPE: ICD-10-CM

## 2021-02-22 DIAGNOSIS — R73.03 PREDIABETES: ICD-10-CM

## 2021-02-22 DIAGNOSIS — J30.1 HAY FEVER: ICD-10-CM

## 2021-02-22 DIAGNOSIS — R73.01 IMPAIRED FASTING GLUCOSE: Primary | ICD-10-CM

## 2021-02-22 DIAGNOSIS — I10 ESSENTIAL HYPERTENSION: ICD-10-CM

## 2021-02-22 PROCEDURE — 99214 OFFICE O/P EST MOD 30 MIN: CPT | Performed by: NURSE PRACTITIONER

## 2021-02-22 NOTE — TELEPHONE ENCOUNTER
----- Message from Edna Jason, Tina Bennett sent at 2/22/2021  8:31 AM EST -----  Regarding: mammo  02/22/21 8:32 AM    Hello, our patient Caro Barcenas has had Mammogram completed/performed  Please assist in updating the patient chart by pulling the Care Everywhere (CE) document  The date of service is 1/21       Thank you,  Edna Jason MA  [facility]

## 2021-02-22 NOTE — TELEPHONE ENCOUNTER
Upon review of the In Basket request we were able to locate, review, and update the patient chart as requested for Mammogram     Any additional questions or concerns should be emailed to the Practice Liaisons via [email or web address]  org email, please do not reply via In Basket      Thank you  Alessia Nicolas

## 2021-02-22 NOTE — PROGRESS NOTES
BMI Counseling: Body mass index is 27 96 kg/m²  The BMI is above normal  Nutrition recommendations include decreasing portion sizes, encouraging healthy choices of fruits and vegetables, decreasing fast food intake, consuming healthier snacks, limiting drinks that contain sugar, moderation in carbohydrate intake, increasing intake of lean protein, reducing intake of saturated and trans fat and reducing intake of cholesterol  Exercise recommendations include exercising 3-5 times per week  No pharmacotherapy was ordered  Patient referred to PCP due to patient being overweight  Assessment/Plan:    1  Hypertension- At goal  Continue present medications  2  Hyperlipidemia- LDL is elevated  Coronary calcium score was zero  Will be going back to gym, continue diet and lifestyle and will check in six months  3  Prediabetes- A1c 5 5, continue diet lifestyle  Up to date with mammogram and colonoscopy  Follow up in six months, labs prior  Diagnoses and all orders for this visit:    Impaired fasting glucose  -     CBC and differential; Future  -     Comprehensive metabolic panel; Future  -     HEMOGLOBIN A1C W/ EAG ESTIMATION; Future  -     Lipid panel; Future  -     TSH, 3rd generation with Free T4 reflex; Future    Essential hypertension  -     CBC and differential; Future  -     Comprehensive metabolic panel; Future  -     HEMOGLOBIN A1C W/ EAG ESTIMATION; Future  -     Lipid panel; Future  -     TSH, 3rd generation with Free T4 reflex; Future    Prediabetes  -     CBC and differential; Future  -     Comprehensive metabolic panel; Future  -     HEMOGLOBIN A1C W/ EAG ESTIMATION; Future  -     Lipid panel; Future  -     TSH, 3rd generation with Free T4 reflex; Future    Hay fever  -     CBC and differential; Future  -     Comprehensive metabolic panel; Future  -     HEMOGLOBIN A1C W/ EAG ESTIMATION; Future  -     Lipid panel; Future  -     TSH, 3rd generation with Free T4 reflex;  Future    Hyperlipidemia, unspecified hyperlipidemia type  -     CBC and differential; Future  -     Comprehensive metabolic panel; Future  -     HEMOGLOBIN A1C W/ EAG ESTIMATION; Future  -     Lipid panel; Future  -     TSH, 3rd generation with Free T4 reflex; Future        The patient was counseled regarding instructions for management, risk factor reductions, patient and family education,impressions, risks and benefits of treatment options, side effects of medications, importance of compliance with treatment  The treatment plan was reviewed with the patient/guardian and patient/guardian understands and agrees with the treatment plan  Current Outpatient Medications:     acetaminophen (TYLENOL) 500 mg tablet, Take 2 tablets by mouth 3 (three) times a day, Disp: , Rfl:     amLODIPine (NORVASC) 2 5 mg tablet, Take 1 tablet (2 5 mg total) by mouth daily, Disp: 90 tablet, Rfl: 2    cholecalciferol (VITAMIN D3) 1,000 units tablet, Take by mouth, Disp: , Rfl:     Chromium 1 MG CAPS, Take by mouth, Disp: , Rfl:     Glucosamine-Chondroitin 750-600 MG TABS, Take 1 tablet by mouth 2 (two) times a day, Disp: , Rfl:     Lactobacillus Rhamnosus, GG, ([employer] PROBIOTIC, LACTOBACILLUS, PO), Take by mouth, Disp: , Rfl:     Multiple Vitamins-Calcium (TGT DAILY MULTIVITAMIN WOMENS PO), Take by mouth, Disp: , Rfl:     Omega-3 Fatty Acids (FISH OIL) 645 MG CAPS, Take by mouth, Disp: , Rfl:     Subjective:      Patient ID: Eulogio Black is a 77 y o  female  Here for follow up  No complaints today  The following portions of the patient's history were reviewed and updated as appropriate:   She has a past medical history of Gallbladder disease  ,  does not have any pertinent problems on file  ,   has a past surgical history that includes Cervical spine surgery and Cholecystectomy (1987)  ,  family history includes Hyperlipidemia in her father and mother; Osteoarthritis in her family; Other in her child, mother, and sister  ,   reports that she quit smoking about 39 years ago  She has never used smokeless tobacco  She reports that she does not drink alcohol or use drugs  ,  is allergic to other; pollen extract; and macrobid [nitrofurantoin]       Review of Systems   Constitutional: Negative  Respiratory: Negative  Cardiovascular: Negative  Musculoskeletal: Negative  Psychiatric/Behavioral: Negative  Objective:  /82 (BP Location: Left arm, Patient Position: Sitting, Cuff Size: Standard)   Pulse 87   Temp 97 6 °F (36 4 °C) (Tympanic)   Resp 16   Ht 5' 1" (1 549 m)   Wt 67 1 kg (148 lb)   SpO2 99%   BMI 27 96 kg/m²     Lab Review  No visits with results within 2 Month(s) from this visit     Latest known visit with results is:   Appointment on 09/09/2020   Component Date Value    WBC 02/17/2021 5 58     RBC 02/17/2021 4 85     Hemoglobin 02/17/2021 13 2     Hematocrit 02/17/2021 41 1     MCV 02/17/2021 85     MCH 02/17/2021 27 2     MCHC 02/17/2021 32 1     RDW 02/17/2021 13 1     MPV 02/17/2021 11 5     Platelets 22/56/0754 211     nRBC 02/17/2021 0     Neutrophils Relative 02/17/2021 53     Immat GRANS % 02/17/2021 0     Lymphocytes Relative 02/17/2021 39     Monocytes Relative 02/17/2021 6     Eosinophils Relative 02/17/2021 2     Basophils Relative 02/17/2021 0     Neutrophils Absolute 02/17/2021 2 99     Immature Grans Absolute 02/17/2021 0 01     Lymphocytes Absolute 02/17/2021 2 15     Monocytes Absolute 02/17/2021 0 32     Eosinophils Absolute 02/17/2021 0 09     Basophils Absolute 02/17/2021 0 02     Sodium 02/17/2021 140     Potassium 02/17/2021 3 8     Chloride 02/17/2021 104     CO2 02/17/2021 29     ANION GAP 02/17/2021 7     BUN 02/17/2021 13     Creatinine 02/17/2021 0 77     Glucose, Fasting 02/17/2021 117*    Calcium 02/17/2021 9 2     AST 02/17/2021 20     ALT 02/17/2021 21     Alkaline Phosphatase 02/17/2021 76     Total Protein 02/17/2021 7 4     Albumin 02/17/2021 3 6  Total Bilirubin 02/17/2021 0 50     eGFR 02/17/2021 80     LDL Direct 02/17/2021 172*    Cholesterol 02/17/2021 260*    Triglycerides 02/17/2021 114     HDL, Direct 02/17/2021 60     LDL Calculated 02/17/2021 177*    Hemoglobin A1C 02/17/2021 5 5     EAG 02/17/2021 111     Vit D, 25-Hydroxy 02/17/2021 54 8     TSH 3RD GENERATON 02/17/2021 1 714       Imaging: No results found  Physical Exam  Constitutional:       Appearance: She is well-developed  Cardiovascular:      Rate and Rhythm: Normal rate and regular rhythm  Heart sounds: Normal heart sounds  Pulmonary:      Effort: Pulmonary effort is normal       Breath sounds: Normal breath sounds  Musculoskeletal: Normal range of motion  Neurological:      Mental Status: She is alert and oriented to person, place, and time  Deep Tendon Reflexes: Reflexes are normal and symmetric  Psychiatric:         Behavior: Behavior normal          Thought Content:  Thought content normal          Judgment: Judgment normal

## 2021-02-22 NOTE — PATIENT INSTRUCTIONS
1  Hypertension- At goal  Continue present medications  2  Hyperlipidemia- LDL is elevated  Coronary calcium score was zero  Will be going back to gym, continue diet and lifestyle and will check in six months  3  Prediabetes- A1c 5 5, continue diet lifestyle  Up to date with mammogram and colonoscopy  Follow up in six months, labs prior

## 2021-03-10 DIAGNOSIS — Z23 ENCOUNTER FOR IMMUNIZATION: ICD-10-CM

## 2021-07-15 DIAGNOSIS — I10 HYPERTENSION, UNSPECIFIED TYPE: ICD-10-CM

## 2021-07-19 RX ORDER — AMLODIPINE BESYLATE 2.5 MG/1
TABLET ORAL
Qty: 90 TABLET | Refills: 2 | Status: SHIPPED | OUTPATIENT
Start: 2021-07-19 | End: 2022-04-18

## 2021-09-07 ENCOUNTER — APPOINTMENT (OUTPATIENT)
Dept: LAB | Facility: HOSPITAL | Age: 66
End: 2021-09-07
Payer: MEDICARE

## 2021-09-07 DIAGNOSIS — R73.03 PREDIABETES: ICD-10-CM

## 2021-09-07 DIAGNOSIS — E78.5 HYPERLIPIDEMIA, UNSPECIFIED HYPERLIPIDEMIA TYPE: ICD-10-CM

## 2021-09-07 DIAGNOSIS — I10 ESSENTIAL HYPERTENSION: ICD-10-CM

## 2021-09-07 DIAGNOSIS — J30.1 HAY FEVER: ICD-10-CM

## 2021-09-07 DIAGNOSIS — R73.01 IMPAIRED FASTING GLUCOSE: ICD-10-CM

## 2021-09-07 LAB
ALBUMIN SERPL BCP-MCNC: 3.8 G/DL (ref 3.5–5)
ALP SERPL-CCNC: 76 U/L (ref 46–116)
ALT SERPL W P-5'-P-CCNC: 23 U/L (ref 12–78)
ANION GAP SERPL CALCULATED.3IONS-SCNC: 8 MMOL/L (ref 4–13)
AST SERPL W P-5'-P-CCNC: 18 U/L (ref 5–45)
BASOPHILS # BLD AUTO: 0.03 THOUSANDS/ΜL (ref 0–0.1)
BASOPHILS NFR BLD AUTO: 1 % (ref 0–1)
BILIRUB SERPL-MCNC: 0.59 MG/DL (ref 0.2–1)
BUN SERPL-MCNC: 13 MG/DL (ref 5–25)
CALCIUM SERPL-MCNC: 9.3 MG/DL (ref 8.3–10.1)
CHLORIDE SERPL-SCNC: 105 MMOL/L (ref 100–108)
CHOLEST SERPL-MCNC: 271 MG/DL (ref 50–200)
CO2 SERPL-SCNC: 29 MMOL/L (ref 21–32)
CREAT SERPL-MCNC: 0.78 MG/DL (ref 0.6–1.3)
EOSINOPHIL # BLD AUTO: 0.08 THOUSAND/ΜL (ref 0–0.61)
EOSINOPHIL NFR BLD AUTO: 1 % (ref 0–6)
ERYTHROCYTE [DISTWIDTH] IN BLOOD BY AUTOMATED COUNT: 13.2 % (ref 11.6–15.1)
EST. AVERAGE GLUCOSE BLD GHB EST-MCNC: 114 MG/DL
GFR SERPL CREATININE-BSD FRML MDRD: 78 ML/MIN/1.73SQ M
GLUCOSE P FAST SERPL-MCNC: 112 MG/DL (ref 65–99)
HBA1C MFR BLD: 5.6 %
HCT VFR BLD AUTO: 40.8 % (ref 34.8–46.1)
HDLC SERPL-MCNC: 59 MG/DL
HGB BLD-MCNC: 13 G/DL (ref 11.5–15.4)
IMM GRANULOCYTES # BLD AUTO: 0.01 THOUSAND/UL (ref 0–0.2)
IMM GRANULOCYTES NFR BLD AUTO: 0 % (ref 0–2)
LDLC SERPL CALC-MCNC: 192 MG/DL (ref 0–100)
LYMPHOCYTES # BLD AUTO: 2.16 THOUSANDS/ΜL (ref 0.6–4.47)
LYMPHOCYTES NFR BLD AUTO: 37 % (ref 14–44)
MCH RBC QN AUTO: 27.3 PG (ref 26.8–34.3)
MCHC RBC AUTO-ENTMCNC: 31.9 G/DL (ref 31.4–37.4)
MCV RBC AUTO: 86 FL (ref 82–98)
MONOCYTES # BLD AUTO: 0.4 THOUSAND/ΜL (ref 0.17–1.22)
MONOCYTES NFR BLD AUTO: 7 % (ref 4–12)
NEUTROPHILS # BLD AUTO: 3.19 THOUSANDS/ΜL (ref 1.85–7.62)
NEUTS SEG NFR BLD AUTO: 54 % (ref 43–75)
NONHDLC SERPL-MCNC: 212 MG/DL
NRBC BLD AUTO-RTO: 0 /100 WBCS
PLATELET # BLD AUTO: 210 THOUSANDS/UL (ref 149–390)
PMV BLD AUTO: 11.1 FL (ref 8.9–12.7)
POTASSIUM SERPL-SCNC: 4.1 MMOL/L (ref 3.5–5.3)
PROT SERPL-MCNC: 7.5 G/DL (ref 6.4–8.2)
RBC # BLD AUTO: 4.77 MILLION/UL (ref 3.81–5.12)
SODIUM SERPL-SCNC: 142 MMOL/L (ref 136–145)
TRIGL SERPL-MCNC: 102 MG/DL
TSH SERPL DL<=0.05 MIU/L-ACNC: 1.24 UIU/ML (ref 0.36–3.74)
WBC # BLD AUTO: 5.87 THOUSAND/UL (ref 4.31–10.16)

## 2021-09-07 PROCEDURE — 85025 COMPLETE CBC W/AUTO DIFF WBC: CPT

## 2021-09-07 PROCEDURE — 80053 COMPREHEN METABOLIC PANEL: CPT

## 2021-09-07 PROCEDURE — 84443 ASSAY THYROID STIM HORMONE: CPT

## 2021-09-07 PROCEDURE — 80061 LIPID PANEL: CPT

## 2021-09-07 PROCEDURE — 83036 HEMOGLOBIN GLYCOSYLATED A1C: CPT

## 2021-09-07 PROCEDURE — 36415 COLL VENOUS BLD VENIPUNCTURE: CPT

## 2021-09-13 ENCOUNTER — OFFICE VISIT (OUTPATIENT)
Dept: INTERNAL MEDICINE CLINIC | Facility: CLINIC | Age: 66
End: 2021-09-13
Payer: MEDICARE

## 2021-09-13 VITALS
BODY MASS INDEX: 27.98 KG/M2 | TEMPERATURE: 97.1 F | HEART RATE: 78 BPM | WEIGHT: 148.2 LBS | DIASTOLIC BLOOD PRESSURE: 82 MMHG | OXYGEN SATURATION: 99 % | HEIGHT: 61 IN | SYSTOLIC BLOOD PRESSURE: 130 MMHG

## 2021-09-13 DIAGNOSIS — M25.562 ACUTE PAIN OF LEFT KNEE: ICD-10-CM

## 2021-09-13 DIAGNOSIS — I10 ESSENTIAL HYPERTENSION: Primary | ICD-10-CM

## 2021-09-13 DIAGNOSIS — N28.1 CYST OF RIGHT KIDNEY: ICD-10-CM

## 2021-09-13 DIAGNOSIS — E78.5 HYPERLIPIDEMIA, UNSPECIFIED HYPERLIPIDEMIA TYPE: ICD-10-CM

## 2021-09-13 DIAGNOSIS — R73.01 IMPAIRED FASTING GLUCOSE: ICD-10-CM

## 2021-09-13 DIAGNOSIS — R73.03 PREDIABETES: ICD-10-CM

## 2021-09-13 PROCEDURE — 99214 OFFICE O/P EST MOD 30 MIN: CPT | Performed by: NURSE PRACTITIONER

## 2021-09-13 NOTE — PROGRESS NOTES
Assessment/Plan:    1  Hypertension- At goal  Continue present medication  2  Hyperlipidemia- LDL markedly elevated  Coronary calcium score was zero  Will buckle down on diet and recheck in six months  3  Prediabetes- A1c at goal (5 6)  4  Left knee pain- XR left knee ordered, referred to ortho, Dr Emperatriz Mcgee  Follow up in six months, labs prior  Diagnoses and all orders for this visit:    Essential hypertension  -     CBC and differential; Future  -     Comprehensive metabolic panel; Future  -     TSH, 3rd generation with Free T4 reflex; Future    Impaired fasting glucose  -     HEMOGLOBIN A1C W/ EAG ESTIMATION; Future    Hyperlipidemia, unspecified hyperlipidemia type  -     Lipid panel; Future    Prediabetes  -     HEMOGLOBIN A1C W/ EAG ESTIMATION; Future    Cyst of right kidney 2 3 x 2 6 x 2 4 cm complex lesion right kidney MRI constrast benign    Acute pain of left knee  -     XR knee 3 vw left non injury; Future  -     Ambulatory referral to Sports Medicine; Future    The patient was counseled regarding instructions for management, risk factor reductions, patient and family education,impressions, risks and benefits of treatment options, side effects of medications, importance of compliance with treatment  The treatment plan was reviewed with the patient/guardian and patient/guardian understands and agrees with the treatment plan          Current Outpatient Medications:     acetaminophen (TYLENOL) 500 mg tablet, Take 2 tablets by mouth 3 (three) times a day, Disp: , Rfl:     amLODIPine (NORVASC) 2 5 mg tablet, TAKE 1 TABLET BY MOUTH EVERY DAY, Disp: 90 tablet, Rfl: 2    cholecalciferol (VITAMIN D3) 1,000 units tablet, Take by mouth, Disp: , Rfl:     Chromium 1 MG CAPS, Take by mouth, Disp: , Rfl:     Glucosamine-Chondroitin 750-600 MG TABS, Take 1 tablet by mouth 2 (two) times a day, Disp: , Rfl:     Lactobacillus Rhamnosus, GG, (CVS PROBIOTIC, LACTOBACILLUS, PO), Take by mouth, Disp: , Rfl:    Multiple Vitamins-Calcium (TGT DAILY MULTIVITAMIN WOMENS PO), Take by mouth, Disp: , Rfl:     Omega-3 Fatty Acids (FISH OIL) 645 MG CAPS, Take by mouth, Disp: , Rfl:     Subjective:      Patient ID: Karo Rodriguez is a 74 y o  female  Here for follow up  Has left posterior knee pain with anterior swelling and tightness, does have pain with walking, causing her to limp  No known injury  The following portions of the patient's history were reviewed and updated as appropriate:   She has a past medical history of Gallbladder disease  ,  does not have any pertinent problems on file  ,   has a past surgical history that includes Cervical spine surgery and Cholecystectomy (1987)  ,  family history includes Hyperlipidemia in her father and mother; Osteoarthritis in her family; Other in her child, mother, and sister  ,   reports that she quit smoking about 40 years ago  She has never used smokeless tobacco  She reports that she does not drink alcohol and does not use drugs  ,  is allergic to other, pollen extract, and macrobid [nitrofurantoin]       Review of Systems   Constitutional: Negative  Respiratory: Negative  Cardiovascular: Negative  Musculoskeletal: Negative  Left knee pain   Psychiatric/Behavioral: Negative          Objective:  /82   Pulse 78   Temp (!) 97 1 °F (36 2 °C) (Tympanic)   Ht 5' 1" (1 549 m)   Wt 67 2 kg (148 lb 3 2 oz)   SpO2 99%   BMI 28 00 kg/m²     Lab Review  Appointment on 09/07/2021   Component Date Value    WBC 09/07/2021 5 87     RBC 09/07/2021 4 77     Hemoglobin 09/07/2021 13 0     Hematocrit 09/07/2021 40 8     MCV 09/07/2021 86     MCH 09/07/2021 27 3     MCHC 09/07/2021 31 9     RDW 09/07/2021 13 2     MPV 09/07/2021 11 1     Platelets 73/08/9943 210     nRBC 09/07/2021 0     Neutrophils Relative 09/07/2021 54     Immat GRANS % 09/07/2021 0     Lymphocytes Relative 09/07/2021 37     Monocytes Relative 09/07/2021 7     Eosinophils Relative 09/07/2021 1     Basophils Relative 09/07/2021 1     Neutrophils Absolute 09/07/2021 3 19     Immature Grans Absolute 09/07/2021 0 01     Lymphocytes Absolute 09/07/2021 2 16     Monocytes Absolute 09/07/2021 0 40     Eosinophils Absolute 09/07/2021 0 08     Basophils Absolute 09/07/2021 0 03     Sodium 09/07/2021 142     Potassium 09/07/2021 4 1     Chloride 09/07/2021 105     CO2 09/07/2021 29     ANION GAP 09/07/2021 8     BUN 09/07/2021 13     Creatinine 09/07/2021 0 78     Glucose, Fasting 09/07/2021 112*    Calcium 09/07/2021 9 3     AST 09/07/2021 18     ALT 09/07/2021 23     Alkaline Phosphatase 09/07/2021 76     Total Protein 09/07/2021 7 5     Albumin 09/07/2021 3 8     Total Bilirubin 09/07/2021 0 59     eGFR 09/07/2021 78     Hemoglobin A1C 09/07/2021 5 6     EAG 09/07/2021 114     Cholesterol 09/07/2021 271*    Triglycerides 09/07/2021 102     HDL, Direct 09/07/2021 59     LDL Calculated 09/07/2021 192*    Non-HDL-Chol (CHOL-HDL) 09/07/2021 212     TSH 3RD GENERATON 09/07/2021 1 240       Imaging: No results found  Physical Exam  Constitutional:       Appearance: She is well-developed  Cardiovascular:      Rate and Rhythm: Normal rate and regular rhythm  Heart sounds: Normal heart sounds  Pulmonary:      Effort: Pulmonary effort is normal       Breath sounds: Normal breath sounds  Musculoskeletal:         General: Swelling present  Normal range of motion  Neurological:      Mental Status: She is alert and oriented to person, place, and time  Deep Tendon Reflexes: Reflexes are normal and symmetric  Psychiatric:         Behavior: Behavior normal          Thought Content:  Thought content normal          Judgment: Judgment normal

## 2021-09-13 NOTE — PATIENT INSTRUCTIONS
1  Hypertension- At goal  Continue present medication  2  Hyperlipidemia- LDL markedly elevated  Coronary calcium score was zero  Will buckle down on diet and recheck in six months  3  Prediabetes- A1c at goal (5 6)  4  Left knee pain- XR left knee ordered, referred to ortho, Dr Sumanth Gates  Follow up in six months, labs prior

## 2021-09-20 ENCOUNTER — OFFICE VISIT (OUTPATIENT)
Dept: OBGYN CLINIC | Facility: CLINIC | Age: 66
End: 2021-09-20
Payer: MEDICARE

## 2021-09-20 VITALS
BODY MASS INDEX: 27.94 KG/M2 | SYSTOLIC BLOOD PRESSURE: 150 MMHG | WEIGHT: 148 LBS | DIASTOLIC BLOOD PRESSURE: 80 MMHG | HEART RATE: 88 BPM | HEIGHT: 61 IN

## 2021-09-20 DIAGNOSIS — M22.2X2 PATELLOFEMORAL SYNDROME OF BOTH KNEES: Primary | ICD-10-CM

## 2021-09-20 DIAGNOSIS — M62.9 HAMSTRING TIGHTNESS OF BOTH LOWER EXTREMITIES: ICD-10-CM

## 2021-09-20 DIAGNOSIS — M22.2X1 PATELLOFEMORAL SYNDROME OF BOTH KNEES: Primary | ICD-10-CM

## 2021-09-20 DIAGNOSIS — M76.32 IT BAND SYNDROME, LEFT: ICD-10-CM

## 2021-09-20 DIAGNOSIS — M25.651 STIFFNESS OF BOTH HIP JOINTS: ICD-10-CM

## 2021-09-20 DIAGNOSIS — M25.652 STIFFNESS OF BOTH HIP JOINTS: ICD-10-CM

## 2021-09-20 DIAGNOSIS — R29.898 WEAKNESS OF BOTH HIPS: ICD-10-CM

## 2021-09-20 PROCEDURE — 1123F ACP DISCUSS/DSCN MKR DOCD: CPT | Performed by: FAMILY MEDICINE

## 2021-09-20 PROCEDURE — 99204 OFFICE O/P NEW MOD 45 MIN: CPT | Performed by: FAMILY MEDICINE

## 2021-09-20 RX ORDER — NAPROXEN 500 MG/1
500 TABLET ORAL 2 TIMES DAILY WITH MEALS
Qty: 30 TABLET | Refills: 0 | Status: SHIPPED | OUTPATIENT
Start: 2021-09-20 | End: 2022-03-23 | Stop reason: ALTCHOICE

## 2021-09-20 NOTE — LETTER
September 20, 2021     Kaity Gant, [address] [place] [address] [place]  [id number]    Patient: Ry Faye   YOB: 1955   Date of Visit: 9/20/2021       Dear Dr Obey Valencia: Thank you for referring Ry Faye to me for evaluation  Below are my notes for this consultation  If you have questions, please do not hesitate to call me  I look forward to following your patient along with you  Sincerely,        [employer], DO        CC: No Recipients  [employer], DO  9/20/2021  8:29 AM  Sign when Signing Visit  Assessment/Plan:  Assessment/Plan   Diagnoses and all orders for this visit:    Patellofemoral syndrome of both knees  -     Ambulatory referral to Sports Medicine  -     naproxen (NAPROSYN) 500 mg tablet; Take 1 tablet (500 mg total) by mouth 2 (two) times a day with meals  -     Ambulatory referral to Physical Therapy; Future    It band syndrome, left  -     Ambulatory referral to Physical Therapy; Future    Weakness of both hips  -     Ambulatory referral to Physical Therapy; Future    Hamstring tightness of both lower extremities  -     Ambulatory referral to Physical Therapy; Future    Stiffness of both hip joints  -     Ambulatory referral to Physical Therapy; Future      53-year-old active female with left knee pain and swelling more than 1 month duration  Discussed with patient physical exam, impression and plan  Physical noted for mild swelling of the knee  She has mild tenderness at the IT band and lateral patellar facet  She has full extension and flexion to 130°  There is no appreciable collateral ligament laxity  There is positive patellar inhibition and grind  There is no groin pain with LETY and FADDIR maneuvers of both hips  She has weakness both hips with abduction  Clinical impression is that she is symptomatic from abnormal patellofemoral mechanics and IT band syndrome    I discussed regimen of anti-inflammatory, supplements, and topical anesthetic  She was advised that surgical intervention is not warranted at this time  She is to take naproxen 500 mg twice daily with food consistently for 2 weeks  She is to start taking tumeric 500 mg twice daily and tart cherry at least 1000 mg daily  She is to continue with taking glucosamine-chondroitin supplements  She may apply topical diclofenac gel 2 to 3 times a day  She is to start physical therapy as soon as possible and do home exercises as directed  She will follow up if no improvement after 4-5 weeks of formal therapy  68-year-old active female presents for evaluation of left knee pain and swelling of more than 1 month duration  She reports onset of symptoms after squatting at the gym  She had pain described as sudden in onset, localized to the anterior and lateral aspect knee, achy and sore, nonradiating, worse with stairs, worse with squatting, associated with swelling, and improved resting  She also reports worsening symptoms with prolonged standing and ambulation  She has been icing and taking Tylenol to help with symptoms  She has not had any buckling, but does feel unstable in the knee  Subjective:   Patient ID: Rosario Hardy is a 74 y o  female  Chief Complaint   Patient presents with    Left Knee - Pain, Swelling       68-year-old active female presents for evaluation of left knee pain and swelling of more than 1 month duration  She reports onset of symptoms after squatting at the gym  She had pain described as sudden in onset, localized to the anterior and lateral aspect knee, achy and sore, nonradiating, worse with stairs, worse with squatting, associated with swelling, and improved resting  She also reports worsening symptoms with prolonged standing and ambulation  She has been icing and taking Tylenol to help with symptoms  She has not had any buckling, but does feel unstable in the knee    She was seen by primary care provider and referred to Sports Medicine  Knee Pain  This is a new problem  The current episode started more than 1 month ago  The problem occurs daily  The problem has been waxing and waning  Associated symptoms include arthralgias and joint swelling  Pertinent negatives include no abdominal pain, chest pain, chills, fever, numbness, rash, sore throat or weakness  The symptoms are aggravated by bending (Stairs)  She has tried rest, acetaminophen and ice for the symptoms  The treatment provided mild relief  The following portions of the patient's history were reviewed and updated as appropriate: She  has a past medical history of Gallbladder disease  She  has a past surgical history that includes Cervical spine surgery and Cholecystectomy (1987)  Her family history includes Hyperlipidemia in her father and mother; Osteoarthritis in her family; Other in her child, mother, and sister  She  reports that she quit smoking about 40 years ago  She has never used smokeless tobacco  She reports that she does not drink alcohol and does not use drugs  She is allergic to other, pollen extract, and macrobid [nitrofurantoin]       Review of Systems   Constitutional: Negative for chills and fever  HENT: Negative for sore throat  Eyes: Negative for visual disturbance  Respiratory: Negative for shortness of breath  Cardiovascular: Negative for chest pain  Gastrointestinal: Negative for abdominal pain  Genitourinary: Negative for flank pain  Musculoskeletal: Positive for arthralgias and joint swelling  Skin: Negative for rash and wound  Neurological: Negative for weakness and numbness  Hematological: Does not bruise/bleed easily  Psychiatric/Behavioral: Negative for self-injury  Objective:  Vitals:    09/20/21 0802   BP: 150/80   Pulse: 88   Weight: 67 1 kg (148 lb)   Height: 5' 1" (1 549 m)     Left Ankle Exam     Tenderness   The patient is experiencing no tenderness     Swelling: none    Range of Motion Dorsiflexion: normal   Plantar flexion: normal     Muscle Strength   Dorsiflexion:  5/5   Plantar flexion:  5/5       Right Knee Exam     Range of Motion   Extension: normal   Flexion: 130       Left Knee Exam     Muscle Strength   The patient has normal left knee strength  Tenderness   The patient is experiencing tenderness in the lateral joint line (Distal IT band, lateral patellar facet)  Range of Motion   Extension: normal   Flexion: 130     Tests   Varus: negative Valgus: negative    Other   Swelling: mild    Comments:  Positive patellar inhibition and grind      Right Hip Exam     Muscle Strength   Abduction: 4/5   Flexion: 5/5     Tests   LETY: negative    Comments:  Negative FADDIR  Hamstring tightness      Left Hip Exam     Muscle Strength   Abduction: 4/5   Flexion: 5/5     Tests   LETY: negative    Comments:  Negative FADDIR  Hamstring tightness          Strength/Myotome Testing     Left Ankle/Foot   Dorsiflexion: 5  Plantar flexion: 5      Physical Exam  Vitals and nursing note reviewed  Constitutional:       General: She is not in acute distress  Appearance: She is well-developed  She is not ill-appearing or diaphoretic  HENT:      Head: Normocephalic  Right Ear: External ear normal       Left Ear: External ear normal    Eyes:      Conjunctiva/sclera: Conjunctivae normal    Neck:      Trachea: No tracheal deviation  Cardiovascular:      Rate and Rhythm: Normal rate  Pulmonary:      Effort: Pulmonary effort is normal  No respiratory distress  Abdominal:      General: There is no distension  Musculoskeletal:         General: Swelling and tenderness present  No deformity or signs of injury  Skin:     General: Skin is warm and dry  Coloration: Skin is not jaundiced or pale  Neurological:      Mental Status: She is alert and oriented to person, place, and time     Psychiatric:         Mood and Affect: Mood normal          Behavior: Behavior normal          Thought Content:  Thought content normal          Judgment: Judgment normal

## 2021-09-20 NOTE — PROGRESS NOTES
Assessment/Plan:  Assessment/Plan   Diagnoses and all orders for this visit:    Patellofemoral syndrome of both knees  -     Ambulatory referral to Sports Medicine  -     naproxen (NAPROSYN) 500 mg tablet; Take 1 tablet (500 mg total) by mouth 2 (two) times a day with meals  -     Ambulatory referral to Physical Therapy; Future    It band syndrome, left  -     Ambulatory referral to Physical Therapy; Future    Weakness of both hips  -     Ambulatory referral to Physical Therapy; Future    Hamstring tightness of both lower extremities  -     Ambulatory referral to Physical Therapy; Future    Stiffness of both hip joints  -     Ambulatory referral to Physical Therapy; Future      68-year-old active female with left knee pain and swelling more than 1 month duration  Discussed with patient physical exam, impression and plan  Physical noted for mild swelling of the knee  She has mild tenderness at the IT band and lateral patellar facet  She has full extension and flexion to 130°  There is no appreciable collateral ligament laxity  There is positive patellar inhibition and grind  There is no groin pain with LETY and FADDIR maneuvers of both hips  She has weakness both hips with abduction  Clinical impression is that she is symptomatic from abnormal patellofemoral mechanics and IT band syndrome  I discussed regimen of anti-inflammatory, supplements, and topical anesthetic  She was advised that surgical intervention is not warranted at this time  She is to take naproxen 500 mg twice daily with food consistently for 2 weeks  She is to start taking tumeric 500 mg twice daily and tart cherry at least 1000 mg daily  She is to continue with taking glucosamine-chondroitin supplements  She may apply topical diclofenac gel 2 to 3 times a day  She is to start physical therapy as soon as possible and do home exercises as directed    She will follow up if no improvement after 4-5 weeks of formal therapy  Subjective:   Patient ID: Lisa Hernandez is a 74 y o  female  Chief Complaint   Patient presents with    Left Knee - Pain, Swelling       53-year-old active female presents for evaluation of left knee pain and swelling of more than 1 month duration  She reports onset of symptoms after squatting at the gym  She had pain described as sudden in onset, localized to the anterior and lateral aspect knee, achy and sore, nonradiating, worse with stairs, worse with squatting, associated with swelling, and improved resting  She also reports worsening symptoms with prolonged standing and ambulation  She has been icing and taking Tylenol to help with symptoms  She has not had any buckling, but does feel unstable in the knee  She was seen by primary care provider and referred to Sports Medicine  Knee Pain  This is a new problem  The current episode started more than 1 month ago  The problem occurs daily  The problem has been waxing and waning  Associated symptoms include arthralgias and joint swelling  Pertinent negatives include no abdominal pain, chest pain, chills, fever, numbness, rash, sore throat or weakness  The symptoms are aggravated by bending (Stairs)  She has tried rest, acetaminophen and ice for the symptoms  The treatment provided mild relief  The following portions of the patient's history were reviewed and updated as appropriate: She  has a past medical history of Gallbladder disease  She  has a past surgical history that includes Cervical spine surgery and Cholecystectomy (1987)  Her family history includes Hyperlipidemia in her father and mother; Osteoarthritis in her family; Other in her child, mother, and sister  She  reports that she quit smoking about 40 years ago  She has never used smokeless tobacco  She reports that she does not drink alcohol and does not use drugs  She is allergic to other, pollen extract, and macrobid [nitrofurantoin]       Review of Systems   Constitutional: Negative for chills and fever  HENT: Negative for sore throat  Eyes: Negative for visual disturbance  Respiratory: Negative for shortness of breath  Cardiovascular: Negative for chest pain  Gastrointestinal: Negative for abdominal pain  Genitourinary: Negative for flank pain  Musculoskeletal: Positive for arthralgias and joint swelling  Skin: Negative for rash and wound  Neurological: Negative for weakness and numbness  Hematological: Does not bruise/bleed easily  Psychiatric/Behavioral: Negative for self-injury  Objective:  Vitals:    09/20/21 0802   BP: 150/80   Pulse: 88   Weight: 67 1 kg (148 lb)   Height: 5' 1" (1 549 m)     Left Ankle Exam     Tenderness   The patient is experiencing no tenderness  Swelling: none    Range of Motion   Dorsiflexion: normal   Plantar flexion: normal     Muscle Strength   Dorsiflexion:  5/5   Plantar flexion:  5/5       Right Knee Exam     Range of Motion   Extension: normal   Flexion: 130       Left Knee Exam     Muscle Strength   The patient has normal left knee strength  Tenderness   The patient is experiencing tenderness in the lateral joint line (Distal IT band, lateral patellar facet)  Range of Motion   Extension: normal   Flexion: 130     Tests   Varus: negative Valgus: negative    Other   Swelling: mild    Comments:  Positive patellar inhibition and grind      Right Hip Exam     Muscle Strength   Abduction: 4/5   Flexion: 5/5     Tests   LETY: negative    Comments:  Negative FADDIR  Hamstring tightness      Left Hip Exam     Muscle Strength   Abduction: 4/5   Flexion: 5/5     Tests   LETY: negative    Comments:  Negative FADDIR  Hamstring tightness          Strength/Myotome Testing     Left Ankle/Foot   Dorsiflexion: 5  Plantar flexion: 5      Physical Exam  Vitals and nursing note reviewed  Constitutional:       General: She is not in acute distress  Appearance: She is well-developed   She is not ill-appearing or diaphoretic  HENT:      Head: Normocephalic  Right Ear: External ear normal       Left Ear: External ear normal    Eyes:      Conjunctiva/sclera: Conjunctivae normal    Neck:      Trachea: No tracheal deviation  Cardiovascular:      Rate and Rhythm: Normal rate  Pulmonary:      Effort: Pulmonary effort is normal  No respiratory distress  Abdominal:      General: There is no distension  Musculoskeletal:         General: Swelling and tenderness present  No deformity or signs of injury  Skin:     General: Skin is warm and dry  Coloration: Skin is not jaundiced or pale  Neurological:      Mental Status: She is alert and oriented to person, place, and time  Psychiatric:         Mood and Affect: Mood normal          Behavior: Behavior normal          Thought Content:  Thought content normal          Judgment: Judgment normal

## 2021-09-20 NOTE — LETTER
September 20, 2021     Neetu [place]a, [address] [place] [address] [id number]    Patient: Yaya Lynch   YOB: 1955   Date of Visit: 9/20/2021       Dear Dr Raymond Carnes: Thank you for referring Yaya Lynch to me for evaluation  Below are my notes for this consultation  If you have questions, please do not hesitate to call me  I look forward to following your patient along with you  Sincerely,        [employer], DO        CC: No Recipients  [employer], DO  9/20/2021  8:29 AM  Sign when Signing Visit  Assessment/Plan:  Assessment/Plan   Diagnoses and all orders for this visit:    Patellofemoral syndrome of both knees  -     Ambulatory referral to Sports Medicine  -     naproxen (NAPROSYN) 500 mg tablet; Take 1 tablet (500 mg total) by mouth 2 (two) times a day with meals  -     Ambulatory referral to Physical Therapy; Future    It band syndrome, left  -     Ambulatory referral to Physical Therapy; Future    Weakness of both hips  -     Ambulatory referral to Physical Therapy; Future    Hamstring tightness of both lower extremities  -     Ambulatory referral to Physical Therapy; Future    Stiffness of both hip joints  -     Ambulatory referral to Physical Therapy; Future      78-year-old active female with left knee pain and swelling more than 1 month duration  Discussed with patient physical exam, impression and plan  Physical noted for mild swelling of the knee  She has mild tenderness at the IT band and lateral patellar facet  She has full extension and flexion to 130°  There is no appreciable collateral ligament laxity  There is positive patellar inhibition and grind  There is no groin pain with LETY and FADDIR maneuvers of both hips  She has weakness both hips with abduction  Clinical impression is that she is symptomatic from abnormal patellofemoral mechanics and IT band syndrome    I discussed regimen of anti-inflammatory, supplements, and topical anesthetic  She was advised that surgical intervention is not warranted at this time  She is to take naproxen 500 mg twice daily with food consistently for 2 weeks  She is to start taking tumeric 500 mg twice daily and tart cherry at least 1000 mg daily  She is to continue with taking glucosamine-chondroitin supplements  She may apply topical diclofenac gel 2 to 3 times a day  She is to start physical therapy as soon as possible and do home exercises as directed  She will follow up if no improvement after 4-5 weeks of formal therapy  Subjective:   Patient ID: Lebron Boas is a 74 y o  female  Chief Complaint   Patient presents with    Left Knee - Pain, Swelling       25-year-old active female presents for evaluation of left knee pain and swelling of more than 1 month duration  She reports onset of symptoms after squatting at the gym  She had pain described as sudden in onset, localized to the anterior and lateral aspect knee, achy and sore, nonradiating, worse with stairs, worse with squatting, associated with swelling, and improved resting  She also reports worsening symptoms with prolonged standing and ambulation  She has been icing and taking Tylenol to help with symptoms  She has not had any buckling, but does feel unstable in the knee  She was seen by primary care provider and referred to Sports Medicine  Knee Pain  This is a new problem  The current episode started more than 1 month ago  The problem occurs daily  The problem has been waxing and waning  Associated symptoms include arthralgias and joint swelling  Pertinent negatives include no abdominal pain, chest pain, chills, fever, numbness, rash, sore throat or weakness  The symptoms are aggravated by bending (Stairs)  She has tried rest, acetaminophen and ice for the symptoms  The treatment provided mild relief             The following portions of the patient's history were reviewed and updated as appropriate: She  has a past medical history of Gallbladder disease  She  has a past surgical history that includes Cervical spine surgery and Cholecystectomy (1987)  Her family history includes Hyperlipidemia in her father and mother; Osteoarthritis in her family; Other in her child, mother, and sister  She  reports that she quit smoking about 40 years ago  She has never used smokeless tobacco  She reports that she does not drink alcohol and does not use drugs  She is allergic to other, pollen extract, and macrobid [nitrofurantoin]       Review of Systems   Constitutional: Negative for chills and fever  HENT: Negative for sore throat  Eyes: Negative for visual disturbance  Respiratory: Negative for shortness of breath  Cardiovascular: Negative for chest pain  Gastrointestinal: Negative for abdominal pain  Genitourinary: Negative for flank pain  Musculoskeletal: Positive for arthralgias and joint swelling  Skin: Negative for rash and wound  Neurological: Negative for weakness and numbness  Hematological: Does not bruise/bleed easily  Psychiatric/Behavioral: Negative for self-injury  Objective:  Vitals:    09/20/21 0802   BP: 150/80   Pulse: 88   Weight: 67 1 kg (148 lb)   Height: 5' 1" (1 549 m)     Left Ankle Exam     Tenderness   The patient is experiencing no tenderness  Swelling: none    Range of Motion   Dorsiflexion: normal   Plantar flexion: normal     Muscle Strength   Dorsiflexion:  5/5   Plantar flexion:  5/5       Right Knee Exam     Range of Motion   Extension: normal   Flexion: 130       Left Knee Exam     Muscle Strength   The patient has normal left knee strength  Tenderness   The patient is experiencing tenderness in the lateral joint line (Distal IT band, lateral patellar facet)      Range of Motion   Extension: normal   Flexion: 130     Tests   Varus: negative Valgus: negative    Other   Swelling: mild    Comments:  Positive patellar inhibition and grind      Right Hip Exam     Muscle Strength   Abduction: 4/5   Flexion: 5/5     Tests   LETY: negative    Comments:  Negative FADDIR  Hamstring tightness      Left Hip Exam     Muscle Strength   Abduction: 4/5   Flexion: 5/5     Tests   LETY: negative    Comments:  Negative FADDIR  Hamstring tightness          Strength/Myotome Testing     Left Ankle/Foot   Dorsiflexion: 5  Plantar flexion: 5      Physical Exam  Vitals and nursing note reviewed  Constitutional:       General: She is not in acute distress  Appearance: She is well-developed  She is not ill-appearing or diaphoretic  HENT:      Head: Normocephalic  Right Ear: External ear normal       Left Ear: External ear normal    Eyes:      Conjunctiva/sclera: Conjunctivae normal    Neck:      Trachea: No tracheal deviation  Cardiovascular:      Rate and Rhythm: Normal rate  Pulmonary:      Effort: Pulmonary effort is normal  No respiratory distress  Abdominal:      General: There is no distension  Musculoskeletal:         General: Swelling and tenderness present  No deformity or signs of injury  Skin:     General: Skin is warm and dry  Coloration: Skin is not jaundiced or pale  Neurological:      Mental Status: She is alert and oriented to person, place, and time  Psychiatric:         Mood and Affect: Mood normal          Behavior: Behavior normal          Thought Content:  Thought content normal          Judgment: Judgment normal

## 2021-10-12 ENCOUNTER — EVALUATION (OUTPATIENT)
Dept: PHYSICAL THERAPY | Facility: CLINIC | Age: 66
End: 2021-10-12
Payer: MEDICARE

## 2021-10-12 DIAGNOSIS — M62.9 HAMSTRING TIGHTNESS OF BOTH LOWER EXTREMITIES: ICD-10-CM

## 2021-10-12 DIAGNOSIS — M22.2X1 PATELLOFEMORAL SYNDROME OF BOTH KNEES: Primary | ICD-10-CM

## 2021-10-12 DIAGNOSIS — M76.32 IT BAND SYNDROME, LEFT: ICD-10-CM

## 2021-10-12 DIAGNOSIS — M22.2X2 PATELLOFEMORAL SYNDROME OF BOTH KNEES: Primary | ICD-10-CM

## 2021-10-12 DIAGNOSIS — R29.898 WEAKNESS OF BOTH HIPS: ICD-10-CM

## 2021-10-12 DIAGNOSIS — M25.652 STIFFNESS OF BOTH HIP JOINTS: ICD-10-CM

## 2021-10-12 DIAGNOSIS — M25.651 STIFFNESS OF BOTH HIP JOINTS: ICD-10-CM

## 2021-10-12 PROCEDURE — 97110 THERAPEUTIC EXERCISES: CPT | Performed by: PHYSICAL THERAPIST

## 2021-10-12 PROCEDURE — 97162 PT EVAL MOD COMPLEX 30 MIN: CPT | Performed by: PHYSICAL THERAPIST

## 2021-10-19 ENCOUNTER — OFFICE VISIT (OUTPATIENT)
Dept: PHYSICAL THERAPY | Facility: CLINIC | Age: 66
End: 2021-10-19
Payer: MEDICARE

## 2021-10-19 DIAGNOSIS — M22.2X2 PATELLOFEMORAL SYNDROME OF BOTH KNEES: Primary | ICD-10-CM

## 2021-10-19 DIAGNOSIS — M25.651 STIFFNESS OF BOTH HIP JOINTS: ICD-10-CM

## 2021-10-19 DIAGNOSIS — M62.9 HAMSTRING TIGHTNESS OF BOTH LOWER EXTREMITIES: ICD-10-CM

## 2021-10-19 DIAGNOSIS — M25.652 STIFFNESS OF BOTH HIP JOINTS: ICD-10-CM

## 2021-10-19 DIAGNOSIS — R29.898 WEAKNESS OF BOTH HIPS: ICD-10-CM

## 2021-10-19 DIAGNOSIS — M22.2X1 PATELLOFEMORAL SYNDROME OF BOTH KNEES: Primary | ICD-10-CM

## 2021-10-19 DIAGNOSIS — M76.32 IT BAND SYNDROME, LEFT: ICD-10-CM

## 2021-10-19 PROCEDURE — 97112 NEUROMUSCULAR REEDUCATION: CPT

## 2021-10-19 PROCEDURE — 97110 THERAPEUTIC EXERCISES: CPT

## 2021-10-21 ENCOUNTER — OFFICE VISIT (OUTPATIENT)
Dept: PHYSICAL THERAPY | Facility: CLINIC | Age: 66
End: 2021-10-21
Payer: MEDICARE

## 2021-10-21 DIAGNOSIS — M22.2X1 PATELLOFEMORAL SYNDROME OF BOTH KNEES: Primary | ICD-10-CM

## 2021-10-21 DIAGNOSIS — M76.32 IT BAND SYNDROME, LEFT: ICD-10-CM

## 2021-10-21 DIAGNOSIS — R29.898 WEAKNESS OF BOTH HIPS: ICD-10-CM

## 2021-10-21 DIAGNOSIS — M25.651 STIFFNESS OF BOTH HIP JOINTS: ICD-10-CM

## 2021-10-21 DIAGNOSIS — M25.652 STIFFNESS OF BOTH HIP JOINTS: ICD-10-CM

## 2021-10-21 DIAGNOSIS — M62.9 HAMSTRING TIGHTNESS OF BOTH LOWER EXTREMITIES: ICD-10-CM

## 2021-10-21 DIAGNOSIS — M22.2X2 PATELLOFEMORAL SYNDROME OF BOTH KNEES: Primary | ICD-10-CM

## 2021-10-21 PROCEDURE — 97140 MANUAL THERAPY 1/> REGIONS: CPT | Performed by: PHYSICAL THERAPIST

## 2021-10-21 PROCEDURE — 97110 THERAPEUTIC EXERCISES: CPT | Performed by: PHYSICAL THERAPIST

## 2021-10-21 PROCEDURE — 97112 NEUROMUSCULAR REEDUCATION: CPT | Performed by: PHYSICAL THERAPIST

## 2021-10-26 ENCOUNTER — OFFICE VISIT (OUTPATIENT)
Dept: PHYSICAL THERAPY | Facility: CLINIC | Age: 66
End: 2021-10-26
Payer: MEDICARE

## 2021-10-26 DIAGNOSIS — M25.652 STIFFNESS OF BOTH HIP JOINTS: ICD-10-CM

## 2021-10-26 DIAGNOSIS — R29.898 WEAKNESS OF BOTH HIPS: ICD-10-CM

## 2021-10-26 DIAGNOSIS — M22.2X2 PATELLOFEMORAL SYNDROME OF BOTH KNEES: Primary | ICD-10-CM

## 2021-10-26 DIAGNOSIS — M76.32 IT BAND SYNDROME, LEFT: ICD-10-CM

## 2021-10-26 DIAGNOSIS — M22.2X1 PATELLOFEMORAL SYNDROME OF BOTH KNEES: Primary | ICD-10-CM

## 2021-10-26 DIAGNOSIS — M62.9 HAMSTRING TIGHTNESS OF BOTH LOWER EXTREMITIES: ICD-10-CM

## 2021-10-26 DIAGNOSIS — M25.651 STIFFNESS OF BOTH HIP JOINTS: ICD-10-CM

## 2021-10-26 PROCEDURE — 97110 THERAPEUTIC EXERCISES: CPT | Performed by: PHYSICAL THERAPIST

## 2021-10-26 PROCEDURE — 97140 MANUAL THERAPY 1/> REGIONS: CPT | Performed by: PHYSICAL THERAPIST

## 2021-10-26 PROCEDURE — 97112 NEUROMUSCULAR REEDUCATION: CPT | Performed by: PHYSICAL THERAPIST

## 2021-10-28 ENCOUNTER — OFFICE VISIT (OUTPATIENT)
Dept: PHYSICAL THERAPY | Facility: CLINIC | Age: 66
End: 2021-10-28
Payer: MEDICARE

## 2021-10-28 DIAGNOSIS — M76.32 IT BAND SYNDROME, LEFT: ICD-10-CM

## 2021-10-28 DIAGNOSIS — M22.2X2 PATELLOFEMORAL SYNDROME OF BOTH KNEES: Primary | ICD-10-CM

## 2021-10-28 DIAGNOSIS — R29.898 WEAKNESS OF BOTH HIPS: ICD-10-CM

## 2021-10-28 DIAGNOSIS — M25.652 STIFFNESS OF BOTH HIP JOINTS: ICD-10-CM

## 2021-10-28 DIAGNOSIS — M22.2X1 PATELLOFEMORAL SYNDROME OF BOTH KNEES: Primary | ICD-10-CM

## 2021-10-28 DIAGNOSIS — M25.651 STIFFNESS OF BOTH HIP JOINTS: ICD-10-CM

## 2021-10-28 DIAGNOSIS — M62.9 HAMSTRING TIGHTNESS OF BOTH LOWER EXTREMITIES: ICD-10-CM

## 2021-10-28 PROCEDURE — 97140 MANUAL THERAPY 1/> REGIONS: CPT

## 2021-10-28 PROCEDURE — 97110 THERAPEUTIC EXERCISES: CPT

## 2021-10-28 PROCEDURE — 97112 NEUROMUSCULAR REEDUCATION: CPT

## 2021-11-03 ENCOUNTER — IMMUNIZATIONS (OUTPATIENT)
Dept: FAMILY MEDICINE CLINIC | Facility: HOSPITAL | Age: 66
End: 2021-11-03

## 2021-11-03 DIAGNOSIS — Z23 ENCOUNTER FOR IMMUNIZATION: Primary | ICD-10-CM

## 2021-11-04 ENCOUNTER — OFFICE VISIT (OUTPATIENT)
Dept: PHYSICAL THERAPY | Facility: CLINIC | Age: 66
End: 2021-11-04
Payer: MEDICARE

## 2021-11-04 DIAGNOSIS — M62.9 HAMSTRING TIGHTNESS OF BOTH LOWER EXTREMITIES: ICD-10-CM

## 2021-11-04 DIAGNOSIS — M76.32 IT BAND SYNDROME, LEFT: ICD-10-CM

## 2021-11-04 DIAGNOSIS — M25.651 STIFFNESS OF BOTH HIP JOINTS: ICD-10-CM

## 2021-11-04 DIAGNOSIS — M22.2X2 PATELLOFEMORAL SYNDROME OF BOTH KNEES: Primary | ICD-10-CM

## 2021-11-04 DIAGNOSIS — M22.2X1 PATELLOFEMORAL SYNDROME OF BOTH KNEES: Primary | ICD-10-CM

## 2021-11-04 DIAGNOSIS — M25.652 STIFFNESS OF BOTH HIP JOINTS: ICD-10-CM

## 2021-11-04 DIAGNOSIS — R29.898 WEAKNESS OF BOTH HIPS: ICD-10-CM

## 2021-11-04 PROCEDURE — 97110 THERAPEUTIC EXERCISES: CPT | Performed by: PHYSICAL THERAPIST

## 2021-11-04 PROCEDURE — 97140 MANUAL THERAPY 1/> REGIONS: CPT | Performed by: PHYSICAL THERAPIST

## 2021-11-05 ENCOUNTER — OFFICE VISIT (OUTPATIENT)
Dept: PHYSICAL THERAPY | Facility: CLINIC | Age: 66
End: 2021-11-05
Payer: MEDICARE

## 2021-11-05 DIAGNOSIS — M22.2X2 PATELLOFEMORAL SYNDROME OF BOTH KNEES: Primary | ICD-10-CM

## 2021-11-05 DIAGNOSIS — M25.652 STIFFNESS OF BOTH HIP JOINTS: ICD-10-CM

## 2021-11-05 DIAGNOSIS — M25.651 STIFFNESS OF BOTH HIP JOINTS: ICD-10-CM

## 2021-11-05 DIAGNOSIS — M76.32 IT BAND SYNDROME, LEFT: ICD-10-CM

## 2021-11-05 DIAGNOSIS — M22.2X1 PATELLOFEMORAL SYNDROME OF BOTH KNEES: Primary | ICD-10-CM

## 2021-11-05 DIAGNOSIS — R29.898 WEAKNESS OF BOTH HIPS: ICD-10-CM

## 2021-11-05 DIAGNOSIS — M62.9 HAMSTRING TIGHTNESS OF BOTH LOWER EXTREMITIES: ICD-10-CM

## 2021-11-05 PROCEDURE — 97140 MANUAL THERAPY 1/> REGIONS: CPT

## 2021-11-05 PROCEDURE — 97110 THERAPEUTIC EXERCISES: CPT

## 2021-11-05 PROCEDURE — 97112 NEUROMUSCULAR REEDUCATION: CPT

## 2021-11-09 ENCOUNTER — OFFICE VISIT (OUTPATIENT)
Dept: PHYSICAL THERAPY | Facility: CLINIC | Age: 66
End: 2021-11-09
Payer: MEDICARE

## 2021-11-09 DIAGNOSIS — M25.651 STIFFNESS OF BOTH HIP JOINTS: ICD-10-CM

## 2021-11-09 DIAGNOSIS — M62.9 HAMSTRING TIGHTNESS OF BOTH LOWER EXTREMITIES: ICD-10-CM

## 2021-11-09 DIAGNOSIS — M25.652 STIFFNESS OF BOTH HIP JOINTS: ICD-10-CM

## 2021-11-09 DIAGNOSIS — M22.2X1 PATELLOFEMORAL SYNDROME OF BOTH KNEES: Primary | ICD-10-CM

## 2021-11-09 DIAGNOSIS — R29.898 WEAKNESS OF BOTH HIPS: ICD-10-CM

## 2021-11-09 DIAGNOSIS — M22.2X2 PATELLOFEMORAL SYNDROME OF BOTH KNEES: Primary | ICD-10-CM

## 2021-11-09 DIAGNOSIS — M76.32 IT BAND SYNDROME, LEFT: ICD-10-CM

## 2021-11-09 PROCEDURE — 97140 MANUAL THERAPY 1/> REGIONS: CPT

## 2021-11-09 PROCEDURE — 97112 NEUROMUSCULAR REEDUCATION: CPT

## 2021-11-09 PROCEDURE — 97110 THERAPEUTIC EXERCISES: CPT

## 2021-11-11 ENCOUNTER — OFFICE VISIT (OUTPATIENT)
Dept: PHYSICAL THERAPY | Facility: CLINIC | Age: 66
End: 2021-11-11
Payer: MEDICARE

## 2021-11-11 DIAGNOSIS — M76.32 IT BAND SYNDROME, LEFT: ICD-10-CM

## 2021-11-11 DIAGNOSIS — R29.898 WEAKNESS OF BOTH HIPS: ICD-10-CM

## 2021-11-11 DIAGNOSIS — M22.2X2 PATELLOFEMORAL SYNDROME OF BOTH KNEES: ICD-10-CM

## 2021-11-11 DIAGNOSIS — M25.652 STIFFNESS OF BOTH HIP JOINTS: Primary | ICD-10-CM

## 2021-11-11 DIAGNOSIS — M22.2X1 PATELLOFEMORAL SYNDROME OF BOTH KNEES: ICD-10-CM

## 2021-11-11 DIAGNOSIS — M25.651 STIFFNESS OF BOTH HIP JOINTS: Primary | ICD-10-CM

## 2021-11-11 PROCEDURE — 97110 THERAPEUTIC EXERCISES: CPT

## 2021-11-11 PROCEDURE — 97112 NEUROMUSCULAR REEDUCATION: CPT

## 2021-11-16 ENCOUNTER — OFFICE VISIT (OUTPATIENT)
Dept: PHYSICAL THERAPY | Facility: CLINIC | Age: 66
End: 2021-11-16
Payer: MEDICARE

## 2021-11-16 DIAGNOSIS — M25.652 STIFFNESS OF BOTH HIP JOINTS: Primary | ICD-10-CM

## 2021-11-16 DIAGNOSIS — M62.9 HAMSTRING TIGHTNESS OF BOTH LOWER EXTREMITIES: ICD-10-CM

## 2021-11-16 DIAGNOSIS — R29.898 WEAKNESS OF BOTH HIPS: ICD-10-CM

## 2021-11-16 DIAGNOSIS — M25.651 STIFFNESS OF BOTH HIP JOINTS: Primary | ICD-10-CM

## 2021-11-16 DIAGNOSIS — M22.2X2 PATELLOFEMORAL SYNDROME OF BOTH KNEES: ICD-10-CM

## 2021-11-16 DIAGNOSIS — M76.32 IT BAND SYNDROME, LEFT: ICD-10-CM

## 2021-11-16 DIAGNOSIS — M22.2X1 PATELLOFEMORAL SYNDROME OF BOTH KNEES: ICD-10-CM

## 2021-11-16 PROCEDURE — 97140 MANUAL THERAPY 1/> REGIONS: CPT

## 2021-11-16 PROCEDURE — 97110 THERAPEUTIC EXERCISES: CPT

## 2021-11-18 ENCOUNTER — OFFICE VISIT (OUTPATIENT)
Dept: PHYSICAL THERAPY | Facility: CLINIC | Age: 66
End: 2021-11-18
Payer: MEDICARE

## 2021-11-18 DIAGNOSIS — M76.32 IT BAND SYNDROME, LEFT: ICD-10-CM

## 2021-11-18 DIAGNOSIS — R29.898 WEAKNESS OF BOTH HIPS: ICD-10-CM

## 2021-11-18 DIAGNOSIS — M25.651 STIFFNESS OF BOTH HIP JOINTS: Primary | ICD-10-CM

## 2021-11-18 DIAGNOSIS — M22.2X2 PATELLOFEMORAL SYNDROME OF BOTH KNEES: ICD-10-CM

## 2021-11-18 DIAGNOSIS — M25.652 STIFFNESS OF BOTH HIP JOINTS: Primary | ICD-10-CM

## 2021-11-18 DIAGNOSIS — M22.2X1 PATELLOFEMORAL SYNDROME OF BOTH KNEES: ICD-10-CM

## 2021-11-18 DIAGNOSIS — M62.9 HAMSTRING TIGHTNESS OF BOTH LOWER EXTREMITIES: ICD-10-CM

## 2021-11-18 PROCEDURE — 97112 NEUROMUSCULAR REEDUCATION: CPT

## 2021-11-18 PROCEDURE — 97110 THERAPEUTIC EXERCISES: CPT

## 2021-11-23 ENCOUNTER — OFFICE VISIT (OUTPATIENT)
Dept: PHYSICAL THERAPY | Facility: CLINIC | Age: 66
End: 2021-11-23
Payer: MEDICARE

## 2021-11-23 DIAGNOSIS — M22.2X2 PATELLOFEMORAL SYNDROME OF BOTH KNEES: ICD-10-CM

## 2021-11-23 DIAGNOSIS — M22.2X1 PATELLOFEMORAL SYNDROME OF BOTH KNEES: ICD-10-CM

## 2021-11-23 DIAGNOSIS — M62.9 HAMSTRING TIGHTNESS OF BOTH LOWER EXTREMITIES: ICD-10-CM

## 2021-11-23 DIAGNOSIS — M76.32 IT BAND SYNDROME, LEFT: ICD-10-CM

## 2021-11-23 DIAGNOSIS — M25.652 STIFFNESS OF BOTH HIP JOINTS: Primary | ICD-10-CM

## 2021-11-23 DIAGNOSIS — R29.898 WEAKNESS OF BOTH HIPS: ICD-10-CM

## 2021-11-23 DIAGNOSIS — M25.651 STIFFNESS OF BOTH HIP JOINTS: Primary | ICD-10-CM

## 2021-11-23 PROCEDURE — 97112 NEUROMUSCULAR REEDUCATION: CPT

## 2021-11-23 PROCEDURE — 97110 THERAPEUTIC EXERCISES: CPT

## 2022-03-17 ENCOUNTER — RA CDI HCC (OUTPATIENT)
Dept: OTHER | Facility: HOSPITAL | Age: 67
End: 2022-03-17

## 2022-03-17 NOTE — PROGRESS NOTES
[address]  coding opportunities       Chart reviewed, no opportunity found: CHART REVIEWED, NO OPPORTUNITY FOUND        Patients Insurance     Medicare Insurance: Medicare

## 2022-03-19 ENCOUNTER — APPOINTMENT (OUTPATIENT)
Dept: LAB | Facility: HOSPITAL | Age: 67
End: 2022-03-19
Payer: MEDICARE

## 2022-03-19 DIAGNOSIS — R73.03 PREDIABETES: ICD-10-CM

## 2022-03-19 DIAGNOSIS — I10 ESSENTIAL HYPERTENSION: ICD-10-CM

## 2022-03-19 DIAGNOSIS — E78.5 HYPERLIPIDEMIA, UNSPECIFIED HYPERLIPIDEMIA TYPE: ICD-10-CM

## 2022-03-19 DIAGNOSIS — R73.01 IMPAIRED FASTING GLUCOSE: ICD-10-CM

## 2022-03-19 LAB
ALBUMIN SERPL BCP-MCNC: 3.6 G/DL (ref 3.5–5)
ALP SERPL-CCNC: 69 U/L (ref 46–116)
ALT SERPL W P-5'-P-CCNC: 20 U/L (ref 12–78)
ANION GAP SERPL CALCULATED.3IONS-SCNC: 8 MMOL/L (ref 4–13)
AST SERPL W P-5'-P-CCNC: 19 U/L (ref 5–45)
BASOPHILS # BLD AUTO: 0.04 THOUSANDS/ΜL (ref 0–0.1)
BASOPHILS NFR BLD AUTO: 1 % (ref 0–1)
BILIRUB SERPL-MCNC: 0.59 MG/DL (ref 0.2–1)
BUN SERPL-MCNC: 11 MG/DL (ref 5–25)
CALCIUM SERPL-MCNC: 9.1 MG/DL (ref 8.3–10.1)
CHLORIDE SERPL-SCNC: 106 MMOL/L (ref 100–108)
CHOLEST SERPL-MCNC: 256 MG/DL
CO2 SERPL-SCNC: 28 MMOL/L (ref 21–32)
CREAT SERPL-MCNC: 0.72 MG/DL (ref 0.6–1.3)
EOSINOPHIL # BLD AUTO: 0.08 THOUSAND/ΜL (ref 0–0.61)
EOSINOPHIL NFR BLD AUTO: 1 % (ref 0–6)
ERYTHROCYTE [DISTWIDTH] IN BLOOD BY AUTOMATED COUNT: 13 % (ref 11.6–15.1)
GFR SERPL CREATININE-BSD FRML MDRD: 86 ML/MIN/1.73SQ M
GLUCOSE P FAST SERPL-MCNC: 115 MG/DL (ref 65–99)
HCT VFR BLD AUTO: 38.9 % (ref 34.8–46.1)
HDLC SERPL-MCNC: 61 MG/DL
HGB BLD-MCNC: 12.9 G/DL (ref 11.5–15.4)
IMM GRANULOCYTES # BLD AUTO: 0.01 THOUSAND/UL (ref 0–0.2)
IMM GRANULOCYTES NFR BLD AUTO: 0 % (ref 0–2)
LDLC SERPL CALC-MCNC: 177 MG/DL (ref 0–100)
LYMPHOCYTES # BLD AUTO: 2.34 THOUSANDS/ΜL (ref 0.6–4.47)
LYMPHOCYTES NFR BLD AUTO: 40 % (ref 14–44)
MCH RBC QN AUTO: 27.4 PG (ref 26.8–34.3)
MCHC RBC AUTO-ENTMCNC: 33.2 G/DL (ref 31.4–37.4)
MCV RBC AUTO: 83 FL (ref 82–98)
MONOCYTES # BLD AUTO: 0.38 THOUSAND/ΜL (ref 0.17–1.22)
MONOCYTES NFR BLD AUTO: 7 % (ref 4–12)
NEUTROPHILS # BLD AUTO: 3.02 THOUSANDS/ΜL (ref 1.85–7.62)
NEUTS SEG NFR BLD AUTO: 51 % (ref 43–75)
NONHDLC SERPL-MCNC: 195 MG/DL
NRBC BLD AUTO-RTO: 0 /100 WBCS
PLATELET # BLD AUTO: 205 THOUSANDS/UL (ref 149–390)
PMV BLD AUTO: 12 FL (ref 8.9–12.7)
POTASSIUM SERPL-SCNC: 4 MMOL/L (ref 3.5–5.3)
PROT SERPL-MCNC: 6.9 G/DL (ref 6.4–8.2)
RBC # BLD AUTO: 4.71 MILLION/UL (ref 3.81–5.12)
SODIUM SERPL-SCNC: 142 MMOL/L (ref 136–145)
TRIGL SERPL-MCNC: 89 MG/DL
TSH SERPL DL<=0.05 MIU/L-ACNC: 1.24 UIU/ML (ref 0.36–3.74)
WBC # BLD AUTO: 5.87 THOUSAND/UL (ref 4.31–10.16)

## 2022-03-19 PROCEDURE — 80061 LIPID PANEL: CPT

## 2022-03-19 PROCEDURE — 85025 COMPLETE CBC W/AUTO DIFF WBC: CPT

## 2022-03-19 PROCEDURE — 36415 COLL VENOUS BLD VENIPUNCTURE: CPT

## 2022-03-19 PROCEDURE — 83036 HEMOGLOBIN GLYCOSYLATED A1C: CPT

## 2022-03-19 PROCEDURE — 84443 ASSAY THYROID STIM HORMONE: CPT

## 2022-03-19 PROCEDURE — 80053 COMPREHEN METABOLIC PANEL: CPT

## 2022-03-20 LAB
EST. AVERAGE GLUCOSE BLD GHB EST-MCNC: 114 MG/DL
HBA1C MFR BLD: 5.6 %

## 2022-03-23 ENCOUNTER — OFFICE VISIT (OUTPATIENT)
Dept: INTERNAL MEDICINE CLINIC | Facility: CLINIC | Age: 67
End: 2022-03-23
Payer: MEDICARE

## 2022-03-23 VITALS
BODY MASS INDEX: 27.98 KG/M2 | HEIGHT: 61 IN | HEART RATE: 67 BPM | DIASTOLIC BLOOD PRESSURE: 64 MMHG | WEIGHT: 148.2 LBS | SYSTOLIC BLOOD PRESSURE: 128 MMHG | OXYGEN SATURATION: 98 %

## 2022-03-23 DIAGNOSIS — Z12.31 ENCOUNTER FOR SCREENING MAMMOGRAM FOR BREAST CANCER: ICD-10-CM

## 2022-03-23 DIAGNOSIS — M85.88 OTHER SPECIFIED DISORDERS OF BONE DENSITY AND STRUCTURE, OTHER SITE: ICD-10-CM

## 2022-03-23 DIAGNOSIS — M85.80 OSTEOPENIA, UNSPECIFIED LOCATION: ICD-10-CM

## 2022-03-23 DIAGNOSIS — E78.5 HYPERLIPIDEMIA, UNSPECIFIED HYPERLIPIDEMIA TYPE: ICD-10-CM

## 2022-03-23 DIAGNOSIS — R73.01 IMPAIRED FASTING GLUCOSE: Primary | ICD-10-CM

## 2022-03-23 DIAGNOSIS — H81.13 BENIGN PAROXYSMAL POSITIONAL VERTIGO DUE TO BILATERAL VESTIBULAR DISORDER: ICD-10-CM

## 2022-03-23 DIAGNOSIS — N28.1 CYST OF RIGHT KIDNEY: ICD-10-CM

## 2022-03-23 DIAGNOSIS — F41.9 ANXIETY: ICD-10-CM

## 2022-03-23 DIAGNOSIS — R73.03 PREDIABETES: ICD-10-CM

## 2022-03-23 DIAGNOSIS — E78.89 ELEVATED HDL: ICD-10-CM

## 2022-03-23 DIAGNOSIS — I10 ESSENTIAL HYPERTENSION: ICD-10-CM

## 2022-03-23 DIAGNOSIS — M25.562 ACUTE PAIN OF LEFT KNEE: ICD-10-CM

## 2022-03-23 PROCEDURE — 99214 OFFICE O/P EST MOD 30 MIN: CPT | Performed by: NURSE PRACTITIONER

## 2022-03-23 NOTE — PROGRESS NOTES
Answers for HPI/ROS submitted by the patient on 3/23/2022  How often during the last year have you found that you were not able to stop drinking once you had started?: 0 - never  How often during the last year have you failed to do what was normally expected from you because of drinking?: 0 - never  How often during the last year have you needed a first drink in the morning to get yourself going after a heavy drinking session?: 0 - never  How often during the last year have you had a feeling of guilt or remorse after drinking?: 0 - never  How often during the last year have you been unable to remember what happened the night before because you had been drinking?: 0 - never  Have you or someone else been injured as a result of your drinking?: 0 - no  Has a relative or friend or a doctor or another health worker been concerned about your drinking or suggested you cut down?: 0 - no  How would you rate your overall health?: very good  Compared to last year, how is your physical health?: slightly better  In general, how satisfied are you with your life?: very satisfied  Compared to last year, how is your eyesight?: same  Compared to last year, how is your hearing?: same  Compared to last year, how is your emotional/mental health?: slightly better  How often is anger a problem for you?: never, rarely  How often do you feel unusually tired/fatigued?: never, rarely  In the past 7 days, how much pain have you experienced?: none  In the past 6 months, have you lost or gained 10 pounds without trying?: No  One or more falls in the last year: No  In the past 6 months, have you accidentally leaked urine?: No  Do you have trouble with the stairs inside or outside your home?: No  Does your home have working smoke alarms?: Yes  Does your home have a carbon monoxide monitor?: Yes  Which safety hazards (if any) have you experienced in your home? Please select all that apply : none  How would you describe your current diet?  Please select all that apply : Regular  In addition to prescription medications, are you taking any over-the-counter supplements?: Yes  If yes, what supplements are you taking?: multi vitamin, fish oil, turmeric, glucosamine  Can you manage your medications?: Yes  Are you currently taking any opioid medications?: No  Can you walk and transfer into and out of your bed and chair?: Yes  Can you dress and groom yourself?: Yes  Can you bathe or shower yourself?: Yes  Can you feed yourself?: Yes  Can you do your laundry/ housekeeping?: Yes  Can you manage your money, pay your bills, and track your expenses?: Yes  Can you make your own meals?: Yes  Can you do your own shopping?: Yes  Within the last 12 months, have you had any hospitalizations or Emergency Department visits?: No  Do you have a living will?: Yes  Do you have a Durable POA (Power of ) for healthcare decisions?: Yes  Do you have an Advanced Directive for end of life decisions?: Yes  How often have you used an illegal drug (including marijuana) or a prescription medication for non-medical reasons in the past year?: never  What is the typical number of drinks you consume in a day?: 1  What is the typical number of drinks you consume in a week?: 5  How often did you have a drink containing alcohol in the past year?: 2 to 3 times a week  How many drinks did you have on a typical day  when you were drinking in the past year?: 1 to 2  How often did you have 6 or more drinks on one occasion in the past year?: never    BMI Counseling: Body mass index is 28 kg/m²  The BMI is above normal  Nutrition recommendations include decreasing portion sizes, encouraging healthy choices of fruits and vegetables, decreasing fast food intake, consuming healthier snacks, limiting drinks that contain sugar, moderation in carbohydrate intake, increasing intake of lean protein, reducing intake of saturated and trans fat and reducing intake of cholesterol   Exercise recommendations include exercising 3-5 times per week  No pharmacotherapy was ordered  Patient referred to PCP  Rationale for BMI follow-up plan is due to patient being overweight or obese  Depression Screening and Follow-up Plan: Patient was screened for depression during today's encounter  They screened negative with a PHQ-2 score of 0  Assessment/Plan:    1  Hypertension- At goal  Continue present medication  2  Prediabetes- A1c 5 6  3  Hyperlipidemia- LDL is elevated but improved  Coronary calcium score was zero  4  Left knee pain- Followed by Dr Kaushal Mina  Continue Turmeric, Tart Cherry, Glucosamine Chondroitin  Colonoscopy and mammogram up to date  DEXA scan ordered  Follow up in six months, labs prior  Diagnoses and all orders for this visit:    Impaired fasting glucose    Essential hypertension  -     CBC and differential; Future  -     Comprehensive metabolic panel; Future  -     TSH, 3rd generation with Free T4 reflex; Future    Benign paroxysmal positional vertigo due to bilateral vestibular disorder    Cyst of right kidney 2 3 x 2 6 x 2 4 cm complex lesion right kidney MRI constrast benign    Anxiety    Elevated HDL    Hyperlipidemia, unspecified hyperlipidemia type  -     Lipid panel; Future    Prediabetes  -     HEMOGLOBIN A1C W/ EAG ESTIMATION; Future    Acute pain of left knee    Encounter for screening mammogram for breast cancer  -     Mammo screening bilateral w 3d & cad; Future    Osteopenia, unspecified location  -     DXA bone density spine hip and pelvis; Future    Other specified disorders of bone density and structure, other site   -     DXA bone density spine hip and pelvis; Future    Other orders  -     TURMERIC PO; Take by mouth        The patient was counseled regarding instructions for management, risk factor reductions, patient and family education,impressions, risks and benefits of treatment options, side effects of medications, importance of compliance with treatment   The treatment plan was reviewed with the patient/guardian and patient/guardian understands and agrees with the treatment plan  Current Outpatient Medications:     acetaminophen (TYLENOL) 500 mg tablet, Take 2 tablets by mouth 3 (three) times a day, Disp: , Rfl:     amLODIPine (NORVASC) 2 5 mg tablet, TAKE 1 TABLET BY MOUTH EVERY DAY, Disp: 90 tablet, Rfl: 2    cholecalciferol (VITAMIN D3) 1,000 units tablet, Take by mouth, Disp: , Rfl:     Chromium 1 MG CAPS, Take by mouth, Disp: , Rfl:     Glucosamine-Chondroitin 750-600 MG TABS, Take 1 tablet by mouth 2 (two) times a day, Disp: , Rfl:     Lactobacillus Rhamnosus, GG, ([employer] PROBIOTIC, LACTOBACILLUS, PO), Take by mouth, Disp: , Rfl:     Multiple Vitamins-Calcium (TGT DAILY MULTIVITAMIN WOMENS PO), Take by mouth, Disp: , Rfl:     Omega-3 Fatty Acids (FISH OIL) 645 MG CAPS, Take by mouth, Disp: , Rfl:     TURMERIC PO, Take by mouth, Disp: , Rfl:     Subjective:      Patient ID: Tayler Schmitt is a 74 y o  female  Here today for follow up  No complaints today  The following portions of the patient's history were reviewed and updated as appropriate:   She has a past medical history of Gallbladder disease  ,  does not have any pertinent problems on file  ,   has a past surgical history that includes Cervical spine surgery and Cholecystectomy (1987)  ,  family history includes Hyperlipidemia in her father and mother; Osteoarthritis in her family; Other in her child, mother, and sister  ,   reports that she quit smoking about 40 years ago  She has never used smokeless tobacco  She reports that she does not drink alcohol and does not use drugs  ,  is allergic to other, pollen extract, and macrobid [nitrofurantoin]       Review of Systems   Constitutional: Negative  Respiratory: Negative  Cardiovascular: Negative  Musculoskeletal: Negative  Psychiatric/Behavioral: Negative            Objective:  /64   Pulse 67   Ht 5' 1" (1 549 m) Wt 67 2 kg (148 lb 3 2 oz)   SpO2 98%   BMI 28 00 kg/m²     Lab Review  Appointment on 03/19/2022   Component Date Value    WBC 03/19/2022 5 87     RBC 03/19/2022 4 71     Hemoglobin 03/19/2022 12 9     Hematocrit 03/19/2022 38 9     MCV 03/19/2022 83     MCH 03/19/2022 27 4     MCHC 03/19/2022 33 2     RDW 03/19/2022 13 0     MPV 03/19/2022 12 0     Platelets 40/70/4345 205     nRBC 03/19/2022 0     Neutrophils Relative 03/19/2022 51     Immat GRANS % 03/19/2022 0     Lymphocytes Relative 03/19/2022 40     Monocytes Relative 03/19/2022 7     Eosinophils Relative 03/19/2022 1     Basophils Relative 03/19/2022 1     Neutrophils Absolute 03/19/2022 3 02     Immature Grans Absolute 03/19/2022 0 01     Lymphocytes Absolute 03/19/2022 2 34     Monocytes Absolute 03/19/2022 0 38     Eosinophils Absolute 03/19/2022 0 08     Basophils Absolute 03/19/2022 0 04     Sodium 03/19/2022 142     Potassium 03/19/2022 4 0     Chloride 03/19/2022 106     CO2 03/19/2022 28     ANION GAP 03/19/2022 8     BUN 03/19/2022 11     Creatinine 03/19/2022 0 72     Glucose, Fasting 03/19/2022 115*    Calcium 03/19/2022 9 1     AST 03/19/2022 19     ALT 03/19/2022 20     Alkaline Phosphatase 03/19/2022 69     Total Protein 03/19/2022 6 9     Albumin 03/19/2022 3 6     Total Bilirubin 03/19/2022 0 59     eGFR 03/19/2022 86     Hemoglobin A1C 03/19/2022 5 6     EAG 03/19/2022 114     Cholesterol 03/19/2022 256*    Triglycerides 03/19/2022 89     HDL, Direct 03/19/2022 61     LDL Calculated 03/19/2022 177*    Non-HDL-Chol (CHOL-HDL) 03/19/2022 195     TSH 3RD GENERATON 03/19/2022 1 239         Imaging: No results found  Physical Exam  Constitutional:       Appearance: She is well-developed  Cardiovascular:      Rate and Rhythm: Normal rate and regular rhythm  Heart sounds: Normal heart sounds  Pulmonary:      Effort: Pulmonary effort is normal       Breath sounds: Normal breath sounds  Musculoskeletal:         General: Normal range of motion  Neurological:      Mental Status: She is alert and oriented to person, place, and time  Deep Tendon Reflexes: Reflexes are normal and symmetric  Psychiatric:         Behavior: Behavior normal          Thought Content:  Thought content normal          Judgment: Judgment normal

## 2022-03-23 NOTE — PATIENT INSTRUCTIONS
1  Hypertension- At goal  Continue present medication  2  Prediabetes- A1c 5 6  3  Hyperlipidemia- LDL is elevated but improved  Coronary calcium score was zero  4  Left knee pain- Followed by Dr Yinka Wade  Continue Turmeric, Tart Cherry, Glucosamine Chondroitin  Colonoscopy and mammogram up to date  DEXA scan ordered  Follow up in six months, labs prior

## 2022-04-17 DIAGNOSIS — I10 HYPERTENSION, UNSPECIFIED TYPE: ICD-10-CM

## 2022-04-18 RX ORDER — AMLODIPINE BESYLATE 2.5 MG/1
TABLET ORAL
Qty: 90 TABLET | Refills: 2 | Status: SHIPPED | OUTPATIENT
Start: 2022-04-18

## 2022-09-23 ENCOUNTER — APPOINTMENT (OUTPATIENT)
Dept: LAB | Facility: HOSPITAL | Age: 67
End: 2022-09-23
Payer: MEDICARE

## 2022-09-23 DIAGNOSIS — E78.5 HYPERLIPIDEMIA, UNSPECIFIED HYPERLIPIDEMIA TYPE: ICD-10-CM

## 2022-09-23 DIAGNOSIS — R73.03 PREDIABETES: ICD-10-CM

## 2022-09-23 DIAGNOSIS — I10 ESSENTIAL HYPERTENSION: ICD-10-CM

## 2022-09-23 LAB
ALBUMIN SERPL BCP-MCNC: 3.4 G/DL (ref 3.5–5)
ALP SERPL-CCNC: 74 U/L (ref 46–116)
ALT SERPL W P-5'-P-CCNC: 22 U/L (ref 12–78)
ANION GAP SERPL CALCULATED.3IONS-SCNC: 5 MMOL/L (ref 4–13)
AST SERPL W P-5'-P-CCNC: 18 U/L (ref 5–45)
BASOPHILS # BLD AUTO: 0.04 THOUSANDS/ΜL (ref 0–0.1)
BASOPHILS NFR BLD AUTO: 1 % (ref 0–1)
BILIRUB SERPL-MCNC: 0.41 MG/DL (ref 0.2–1)
BUN SERPL-MCNC: 14 MG/DL (ref 5–25)
CALCIUM ALBUM COR SERPL-MCNC: 9.5 MG/DL (ref 8.3–10.1)
CALCIUM SERPL-MCNC: 9 MG/DL (ref 8.3–10.1)
CHLORIDE SERPL-SCNC: 108 MMOL/L (ref 96–108)
CHOLEST SERPL-MCNC: 252 MG/DL
CO2 SERPL-SCNC: 26 MMOL/L (ref 21–32)
CREAT SERPL-MCNC: 0.85 MG/DL (ref 0.6–1.3)
EOSINOPHIL # BLD AUTO: 0.13 THOUSAND/ΜL (ref 0–0.61)
EOSINOPHIL NFR BLD AUTO: 2 % (ref 0–6)
ERYTHROCYTE [DISTWIDTH] IN BLOOD BY AUTOMATED COUNT: 13.3 % (ref 11.6–15.1)
EST. AVERAGE GLUCOSE BLD GHB EST-MCNC: 117 MG/DL
GFR SERPL CREATININE-BSD FRML MDRD: 70 ML/MIN/1.73SQ M
GLUCOSE P FAST SERPL-MCNC: 108 MG/DL (ref 65–99)
HBA1C MFR BLD: 5.7 %
HCT VFR BLD AUTO: 41.4 % (ref 34.8–46.1)
HDLC SERPL-MCNC: 59 MG/DL
HGB BLD-MCNC: 13.3 G/DL (ref 11.5–15.4)
IMM GRANULOCYTES # BLD AUTO: 0.01 THOUSAND/UL (ref 0–0.2)
IMM GRANULOCYTES NFR BLD AUTO: 0 % (ref 0–2)
LDLC SERPL CALC-MCNC: 166 MG/DL (ref 0–100)
LYMPHOCYTES # BLD AUTO: 2.55 THOUSANDS/ΜL (ref 0.6–4.47)
LYMPHOCYTES NFR BLD AUTO: 44 % (ref 14–44)
MCH RBC QN AUTO: 27.5 PG (ref 26.8–34.3)
MCHC RBC AUTO-ENTMCNC: 32.1 G/DL (ref 31.4–37.4)
MCV RBC AUTO: 86 FL (ref 82–98)
MONOCYTES # BLD AUTO: 0.37 THOUSAND/ΜL (ref 0.17–1.22)
MONOCYTES NFR BLD AUTO: 6 % (ref 4–12)
NEUTROPHILS # BLD AUTO: 2.76 THOUSANDS/ΜL (ref 1.85–7.62)
NEUTS SEG NFR BLD AUTO: 47 % (ref 43–75)
NONHDLC SERPL-MCNC: 193 MG/DL
NRBC BLD AUTO-RTO: 0 /100 WBCS
PLATELET # BLD AUTO: 217 THOUSANDS/UL (ref 149–390)
PMV BLD AUTO: 10.8 FL (ref 8.9–12.7)
POTASSIUM SERPL-SCNC: 3.6 MMOL/L (ref 3.5–5.3)
PROT SERPL-MCNC: 7.1 G/DL (ref 6.4–8.4)
RBC # BLD AUTO: 4.84 MILLION/UL (ref 3.81–5.12)
SODIUM SERPL-SCNC: 139 MMOL/L (ref 135–147)
TRIGL SERPL-MCNC: 136 MG/DL
TSH SERPL DL<=0.05 MIU/L-ACNC: 2.13 UIU/ML (ref 0.45–4.5)
WBC # BLD AUTO: 5.86 THOUSAND/UL (ref 4.31–10.16)

## 2022-09-23 PROCEDURE — 80061 LIPID PANEL: CPT

## 2022-09-23 PROCEDURE — 85025 COMPLETE CBC W/AUTO DIFF WBC: CPT

## 2022-09-23 PROCEDURE — 80053 COMPREHEN METABOLIC PANEL: CPT

## 2022-09-23 PROCEDURE — 83036 HEMOGLOBIN GLYCOSYLATED A1C: CPT

## 2022-09-23 PROCEDURE — 36415 COLL VENOUS BLD VENIPUNCTURE: CPT

## 2022-09-23 PROCEDURE — 84443 ASSAY THYROID STIM HORMONE: CPT

## 2022-09-28 ENCOUNTER — OFFICE VISIT (OUTPATIENT)
Dept: INTERNAL MEDICINE CLINIC | Facility: CLINIC | Age: 67
End: 2022-09-28
Payer: MEDICARE

## 2022-09-28 ENCOUNTER — TELEPHONE (OUTPATIENT)
Dept: ADMINISTRATIVE | Facility: OTHER | Age: 67
End: 2022-09-28

## 2022-09-28 VITALS
RESPIRATION RATE: 16 BRPM | SYSTOLIC BLOOD PRESSURE: 124 MMHG | HEART RATE: 98 BPM | HEIGHT: 61 IN | OXYGEN SATURATION: 98 % | WEIGHT: 148 LBS | DIASTOLIC BLOOD PRESSURE: 80 MMHG | BODY MASS INDEX: 27.94 KG/M2

## 2022-09-28 DIAGNOSIS — F41.9 ANXIETY: ICD-10-CM

## 2022-09-28 DIAGNOSIS — M85.80 OSTEOPENIA, UNSPECIFIED LOCATION: ICD-10-CM

## 2022-09-28 DIAGNOSIS — E78.5 HYPERLIPIDEMIA, UNSPECIFIED HYPERLIPIDEMIA TYPE: ICD-10-CM

## 2022-09-28 DIAGNOSIS — I10 ESSENTIAL HYPERTENSION: Primary | ICD-10-CM

## 2022-09-28 DIAGNOSIS — M25.562 ACUTE PAIN OF LEFT KNEE: ICD-10-CM

## 2022-09-28 DIAGNOSIS — Z00.00 MEDICARE ANNUAL WELLNESS VISIT, SUBSEQUENT: ICD-10-CM

## 2022-09-28 DIAGNOSIS — R73.03 PREDIABETES: ICD-10-CM

## 2022-09-28 PROCEDURE — G0439 PPPS, SUBSEQ VISIT: HCPCS | Performed by: NURSE PRACTITIONER

## 2022-09-28 PROCEDURE — 99214 OFFICE O/P EST MOD 30 MIN: CPT | Performed by: NURSE PRACTITIONER

## 2022-09-28 NOTE — TELEPHONE ENCOUNTER
----- Message from Margareth Sandoval, [address] sent at 9/27/2022 12:44 PM EDT -----  Regarding: mammo  09/27/22 12:44 PM    Hello, our patient attached above has had Mammogram completed/performed  Please assist in updating the patient chart by pulling the Care Everywhere (CE) document  The date of service is 1/24/22       Thank you,  Margareth Sandoval MA  [facility]

## 2022-09-28 NOTE — PROGRESS NOTES
Assessment/Plan:    1  Medicare wellness completed  2  Hypertension- At goal  Continue present medication  3  Hyperlipidemia- LDL elevated, but improved over the past year  Buckle down on diet and recheck in six months  Coronary calcium Score was zero  4  Prediabetes- A1c 5 7  Continue diet and lifestyle  5  Left knee pain- Much improved  Followed by Dr Kalia Mohan  Diagnoses and all orders for this visit:    Essential hypertension    Medicare annual wellness visit, subsequent    Osteopenia, unspecified location    Prediabetes    Hyperlipidemia, unspecified hyperlipidemia type    Anxiety    Acute pain of left knee        The patient was counseled regarding instructions for management, risk factor reductions, patient and family education,impressions, risks and benefits of treatment options, side effects of medications, importance of compliance with treatment  The treatment plan was reviewed with the patient/guardian and patient/guardian understands and agrees with the treatment plan  Current Outpatient Medications:     acetaminophen (TYLENOL) 500 mg tablet, Take 2 tablets by mouth 3 (three) times a day, Disp: , Rfl:     amLODIPine (NORVASC) 2 5 mg tablet, TAKE 1 TABLET BY MOUTH EVERY DAY, Disp: 90 tablet, Rfl: 2    cholecalciferol (VITAMIN D3) 1,000 units tablet, Take by mouth, Disp: , Rfl:     Chromium 1 MG CAPS, Take by mouth, Disp: , Rfl:     Glucosamine-Chondroitin 750-600 MG TABS, Take 1 tablet by mouth 2 (two) times a day, Disp: , Rfl:     Lactobacillus Rhamnosus, GG, ([employer] PROBIOTIC, LACTOBACILLUS, PO), Take by mouth, Disp: , Rfl:     Multiple Vitamins-Calcium (TGT DAILY MULTIVITAMIN WOMENS PO), Take by mouth, Disp: , Rfl:     Omega-3 Fatty Acids (FISH OIL) 645 MG CAPS, Take by mouth, Disp: , Rfl:     TURMERIC PO, Take by mouth, Disp: , Rfl:     Subjective:      Patient ID: Henry Panchal is a 69 y o  female  Here for follow up        The following portions of the patient's history were reviewed and updated as appropriate:   She has a past medical history of Gallbladder disease  ,  does not have any pertinent problems on file  ,   has a past surgical history that includes Cervical spine surgery and Cholecystectomy (1987)  ,  family history includes Hyperlipidemia in her father and mother; Osteoarthritis in her family; Other in her child, mother, and sister  ,   reports that she quit smoking about 41 years ago  She has never used smokeless tobacco  She reports that she does not drink alcohol and does not use drugs  ,  is allergic to other, pollen extract, and macrobid [nitrofurantoin]       Review of Systems   Constitutional: Negative  Respiratory: Negative  Cardiovascular: Negative  Musculoskeletal: Negative  Psychiatric/Behavioral: Negative            Objective:  /80 (BP Location: Left arm, Patient Position: Sitting, Cuff Size: Standard)   Pulse 98   Resp 16   Ht 5' 1" (1 549 m)   Wt 67 1 kg (148 lb)   SpO2 98%   BMI 27 96 kg/m²     Lab Review  Appointment on 09/23/2022   Component Date Value    WBC 09/23/2022 5 86     RBC 09/23/2022 4 84     Hemoglobin 09/23/2022 13 3     Hematocrit 09/23/2022 41 4     MCV 09/23/2022 86     MCH 09/23/2022 27 5     MCHC 09/23/2022 32 1     RDW 09/23/2022 13 3     MPV 09/23/2022 10 8     Platelets 64/34/7738 217     nRBC 09/23/2022 0     Neutrophils Relative 09/23/2022 47     Immat GRANS % 09/23/2022 0     Lymphocytes Relative 09/23/2022 44     Monocytes Relative 09/23/2022 6     Eosinophils Relative 09/23/2022 2     Basophils Relative 09/23/2022 1     Neutrophils Absolute 09/23/2022 2 76     Immature Grans Absolute 09/23/2022 0 01     Lymphocytes Absolute 09/23/2022 2 55     Monocytes Absolute 09/23/2022 0 37     Eosinophils Absolute 09/23/2022 0 13     Basophils Absolute 09/23/2022 0 04     Sodium 09/23/2022 139     Potassium 09/23/2022 3 6     Chloride 09/23/2022 108     CO2 09/23/2022 26     ANION GAP 09/23/2022 5     BUN 09/23/2022 14     Creatinine 09/23/2022 0 85     Glucose, Fasting 09/23/2022 108 (A)    Calcium 09/23/2022 9 0     Corrected Calcium 09/23/2022 9 5     AST 09/23/2022 18     ALT 09/23/2022 22     Alkaline Phosphatase 09/23/2022 74     Total Protein 09/23/2022 7 1     Albumin 09/23/2022 3 4 (A)    Total Bilirubin 09/23/2022 0 41     eGFR 09/23/2022 70     Hemoglobin A1C 09/23/2022 5 7 (A)    EAG 09/23/2022 117     Cholesterol 09/23/2022 252 (A)    Triglycerides 09/23/2022 136     HDL, Direct 09/23/2022 59     LDL Calculated 09/23/2022 166 (A)    Non-HDL-Chol (CHOL-HDL) 09/23/2022 193     TSH 3RD GENERATON 09/23/2022 2 130         Imaging: No results found  Physical Exam  Constitutional:       Appearance: She is well-developed  Cardiovascular:      Rate and Rhythm: Normal rate and regular rhythm  Heart sounds: Normal heart sounds  Pulmonary:      Effort: Pulmonary effort is normal       Breath sounds: Normal breath sounds  Musculoskeletal:         General: Normal range of motion  Neurological:      Mental Status: She is alert and oriented to person, place, and time  Deep Tendon Reflexes: Reflexes are normal and symmetric  Psychiatric:         Behavior: Behavior normal          Thought Content:  Thought content normal          Judgment: Judgment normal            Answers for HPI/ROS submitted by the patient on 9/21/2022  How often during the last year have you found that you were not able to stop drinking once you had started?: 0 - never  How often during the last year have you failed to do what was normally expected from you because of drinking?: 0 - never  How often during the last year have you needed a first drink in the morning to get yourself going after a heavy drinking session?: 0 - never  How often during the last year have you had a feeling of guilt or remorse after drinking?: 0 - never  How often during the last year have you been unable to remember what happened the night before because you had been drinking?: 0 - never  Have you or someone else been injured as a result of your drinking?: 0 - no  Has a relative or friend or a doctor or another health worker been concerned about your drinking or suggested you cut down?: 0 - no  How would you rate your overall health?: very good  Compared to last year, how is your physical health?: much better  In general, how satisfied are you with your life?: very satisfied  Compared to last year, how is your eyesight?: same  Compared to last year, how is your hearing?: same  Compared to last year, how is your emotional/mental health?: same  How often is anger a problem for you?: never, rarely  How often do you feel unusually tired/fatigued?: never, rarely  In the past 7 days, how much pain have you experienced?: none  In the past 6 months, have you lost or gained 10 pounds without trying?: No  One or more falls in the last year: No  In the past 6 months, have you accidentally leaked urine?: No  Do you have trouble with the stairs inside or outside your home?: No  Does your home have working smoke alarms?: Yes  Does your home have a carbon monoxide monitor?: Yes  Which safety hazards (if any) have you experienced in your home? Please select all that apply : none  How would you describe your current diet?  Please select all that apply : Limited junk food  In addition to prescription medications, are you taking any over-the-counter supplements?: Yes  If yes, what supplements are you taking?: Vit D, Glucosamine, tart cherry, multi vit  Can you manage your medications?: Yes  Are you currently taking any opioid medications?: No  Can you walk and transfer into and out of your bed and chair?: Yes  Can you dress and groom yourself?: Yes  Can you bathe or shower yourself?: Yes  Can you feed yourself?: Yes  Can you do your laundry/ housekeeping?: Yes  Can you manage your money, pay your bills, and track your expenses?: Yes  Can you make your own meals?: Yes  Can you do your own shopping?: Yes  Within the last 12 months, have you had any hospitalizations or Emergency Department visits?: No  Do you have a living will?: Yes  Do you have a Durable POA (Power of ) for healthcare decisions?: Yes  Do you have an Advanced Directive for end of life decisions?: Yes  How often have you used an illegal drug (including marijuana) or a prescription medication for non-medical reasons in the past year?: never  What is the typical number of drinks you consume in a day?: 1  What is the typical number of drinks you consume in a week?: 3  How often did you have a drink containing alcohol in the past year?: 2 to 3 times a week  How many drinks did you have on a typical day  when you were drinking in the past year?: 1 to 2  How often did you have 6 or more drinks on one occasion in the past year?: never

## 2022-09-28 NOTE — TELEPHONE ENCOUNTER
Upon review of the In Basket request we were able to locate, review, and update the patient chart as requested for Mammogram     Any additional questions or concerns should be emailed to the Practice Liaisons via [email or web address] email, please do not reply via In Basket      Thank you  Allison Recio

## 2022-09-28 NOTE — PATIENT INSTRUCTIONS
Medicare Preventive Visit Patient Instructions  Thank you for completing your Welcome to Medicare Visit or Medicare Annual Wellness Visit today  Your next wellness visit will be due in one year (9/29/2023)  The screening/preventive services that you may require over the next 5-10 years are detailed below  Some tests may not apply to you based off risk factors and/or age  Screening tests ordered at today's visit but not completed yet may show as past due  Also, please note that scanned in results may not display below  Preventive Screenings:  Service Recommendations Previous Testing/Comments   Colorectal Cancer Screening  * Colonoscopy    * Fecal Occult Blood Test (FOBT)/Fecal Immunochemical Test (FIT)  * Fecal DNA/Cologuard Test  * Flexible Sigmoidoscopy Age: 39-70 years old   Colonoscopy: every 10 years (may be performed more frequently if at higher risk)  OR  FOBT/FIT: every 1 year  OR  Cologuard: every 3 years  OR  Sigmoidoscopy: every 5 years  Screening may be recommended earlier than age 39 if at higher risk for colorectal cancer  Also, an individualized decision between you and your healthcare provider will decide whether screening between the ages of 74-80 would be appropriate  Colonoscopy: 04/10/2019  FOBT/FIT: Not on file  Cologuard: Not on file  Sigmoidoscopy: Not on file    Screening Current     Breast Cancer Screening Age: 36 years old  Frequency: every 1-2 years  Not required if history of left and right mastectomy Mammogram: 01/24/2022    Screening Current   Cervical Cancer Screening Between the ages of 21-29, pap smear recommended once every 3 years  Between the ages of 33-67, can perform pap smear with HPV co-testing every 5 years     Recommendations may differ for women with a history of total hysterectomy, cervical cancer, or abnormal pap smears in past  Pap Smear: Not on file    Screening Not Indicated   Hepatitis C Screening Once for adults born between 1945 and 1965  More frequently in patients at high risk for Hepatitis C Hep C Antibody: 05/08/2019    Screening Current   Diabetes Screening 1-2 times per year if you're at risk for diabetes or have pre-diabetes Fasting glucose: 108 mg/dL (9/23/2022)  A1C: 5 7 % (9/23/2022)  Screening Current   Cholesterol Screening Once every 5 years if you don't have a lipid disorder  May order more often based on risk factors  Lipid panel: 09/23/2022    Screening Not Indicated  History Lipid Disorder     Other Preventive Screenings Covered by Medicare:  Abdominal Aortic Aneurysm (AAA) Screening: covered once if your at risk  You're considered to be at risk if you have a family history of AAA  Lung Cancer Screening: covers low dose CT scan once per year if you meet all of the following conditions: (1) Age 50-69; (2) No signs or symptoms of lung cancer; (3) Current smoker or have quit smoking within the last 15 years; (4) You have a tobacco smoking history of at least 20 pack years (packs per day multiplied by number of years you smoked); (5) You get a written order from a healthcare provider  Glaucoma Screening: covered annually if you're considered high risk: (1) You have diabetes OR (2) Family history of glaucoma OR (3)  aged 48 and older OR (3)  American aged 72 and older  Osteoporosis Screening: covered every 2 years if you meet one of the following conditions: (1) You're estrogen deficient and at risk for osteoporosis based off medical history and other findings; (2) Have a vertebral abnormality; (3) On glucocorticoid therapy for more than 3 months; (4) Have primary hyperparathyroidism; (5) On osteoporosis medications and need to assess response to drug therapy  Last bone density test (DXA Scan): 01/09/2020  HIV Screening: covered annually if you're between the age of 12-76  Also covered annually if you are younger than 13 and older than 72 with risk factors for HIV infection   For pregnant patients, it is covered up to 3 times per pregnancy  Immunizations:  Immunization Recommendations   Influenza Vaccine Annual influenza vaccination during flu season is recommended for all persons aged >= 6 months who do not have contraindications   Pneumococcal Vaccine   * Pneumococcal conjugate vaccine = PCV13 (Prevnar 13), PCV15 (Vaxneuvance), PCV20 (Prevnar 20)  * Pneumococcal polysaccharide vaccine = PPSV23 (Pneumovax) Adults 25-60 years old: 1-3 doses may be recommended based on certain risk factors  Adults 72 years old: 1-2 doses may be recommended based off what pneumonia vaccine you previously received   Hepatitis B Vaccine 3 dose series if at intermediate or high risk (ex: diabetes, end stage renal disease, liver disease)   Tetanus (Td) Vaccine - COST NOT COVERED BY MEDICARE PART B Following completion of primary series, a booster dose should be given every 10 years to maintain immunity against tetanus  Td may also be given as tetanus wound prophylaxis  Tdap Vaccine - COST NOT COVERED BY MEDICARE PART B Recommended at least once for all adults  For pregnant patients, recommended with each pregnancy  Shingles Vaccine (Shingrix) - COST NOT COVERED BY MEDICARE PART B  2 shot series recommended in those aged 48 and above     Health Maintenance Due:      Topic Date Due    Breast Cancer Screening: Mammogram  01/24/2023    Colorectal Cancer Screening  04/10/2029    Hepatitis C Screening  Completed     Immunizations Due:      Topic Date Due    COVID-19 Vaccine (1) 1955    Influenza Vaccine (1) 09/01/2022     Advance Directives   What are advance directives? Advance directives are legal documents that state your wishes and plans for medical care  These plans are made ahead of time in case you lose your ability to make decisions for yourself  Advance directives can apply to any medical decision, such as the treatments you want, and if you want to donate organs  What are the types of advance directives?   There are many types of advance directives, and each state has rules about how to use them  You may choose a combination of any of the following:  Living will: This is a written record of the treatment you want  You can also choose which treatments you do not want, which to limit, and which to stop at a certain time  This includes surgery, medicine, IV fluid, and tube feedings  Durable power of  for [facility]): This is a written record that states who you want to make healthcare choices for you when you are unable to make them for yourself  This person, called a proxy, is usually a family member or a friend  You may choose more than 1 proxy  Do not resuscitate (DNR) order:  A DNR order is used in case your heart stops beating or you stop breathing  It is a request not to have certain forms of treatment, such as CPR  A DNR order may be included in other types of advance directives  Medical directive: This covers the care that you want if you are in a coma, near death, or unable to make decisions for yourself  You can list the treatments you want for each condition  Treatment may include pain medicine, surgery, blood transfusions, dialysis, IV or tube feedings, and a ventilator (breathing machine)  Values history: This document has questions about your views, beliefs, and how you feel and think about life  This information can help others choose the care that you would choose  Why are advance directives important? An advance directive helps you control your care  Although spoken wishes may be used, it is better to have your wishes written down  Spoken wishes can be misunderstood, or not followed  Treatments may be given even if you do not want them  An advance directive may make it easier for your family to make difficult choices about your care     Weight Management   Why it is important to manage your weight:  Being overweight increases your risk of health conditions such as heart disease, high blood pressure, type 2 diabetes, and certain types of cancer  It can also increase your risk for osteoarthritis, sleep apnea, and other respiratory problems  Aim for a slow, steady weight loss  Even a small amount of weight loss can lower your risk of health problems  How to lose weight safely:  A safe and healthy way to lose weight is to eat fewer calories and get regular exercise  You can lose up about 1 pound a week by decreasing the number of calories you eat by 500 calories each day  Healthy meal plan for weight management:  A healthy meal plan includes a variety of foods, contains fewer calories, and helps you stay healthy  A healthy meal plan includes the following:  Eat whole-grain foods more often  A healthy meal plan should contain fiber  Fiber is the part of grains, fruits, and vegetables that is not broken down by your body  Whole-grain foods are healthy and provide extra fiber in your diet  Some examples of whole-grain foods are whole-wheat breads and pastas, oatmeal, brown rice, and bulgur  Eat a variety of vegetables every day  Include dark, leafy greens such as spinach, kale, ysabel greens, and mustard greens  Eat yellow and orange vegetables such as carrots, sweet potatoes, and winter squash  Eat a variety of fruits every day  Choose fresh or canned fruit (canned in its own juice or light syrup) instead of juice  Fruit juice has very little or no fiber  Eat low-fat dairy foods  Drink fat-free (skim) milk or 1% milk  Eat fat-free yogurt and low-fat cottage cheese  Try low-fat cheeses such as mozzarella and other reduced-fat cheeses  Choose meat and other protein foods that are low in fat  Choose beans or other legumes such as split peas or lentils  Choose fish, skinless poultry (chicken or turkey), or lean cuts of red meat (beef or pork)  Before you cook meat or poultry, cut off any visible fat  Use less fat and oil  Try baking foods instead of frying them   Add less fat, such as margarine, sour cream, regular salad dressing and mayonnaise to foods  Eat fewer high-fat foods  Some examples of high-fat foods include french fries, doughnuts, ice cream, and cakes  Eat fewer sweets  Limit foods and drinks that are high in sugar  This includes candy, cookies, regular soda, and sweetened drinks  Exercise:  Exercise at least 30 minutes per day on most days of the week  Some examples of exercise include walking, biking, dancing, and swimming  You can also fit in more physical activity by taking the stairs instead of the elevator or parking farther away from stores  Ask your healthcare provider about the best exercise plan for you  Alcohol Use and Your Health    Drinking too much can harm your health  Excessive alcohol use leads to about 88,000 death in the [place] each year, and shortens the life of those who diet by almost 30 years  Further, excessive drinking cost the economy $249 billion in 2010  Most excessive drinkers are not alcohol dependent  Excessive alcohol use has immediate effects that increase the risk of many harmful health conditions  These are most often the result of binge drinking  Over time, excessive alcohol use can lead to the development of chronic diseases and other series health problems  What is considered a "drink"? Excessive alcohol use includes:  Binge Drinking: For women, 4 or more drinks consumed on one occasion  For men, 5 or more drinks consumed on one occasion  Heavy Drinking: For women, 8 or more drinks per week  For men, 15 or more drinks per week  Any alcohol used by pregnant women  Any alcohol used by those under the age of 21 years    If you choose to drink, do so in moderation:  Do not drink at all if you are under the age of 24, or if you are or may be pregnant, or have health problems that could be made worse by drinking    For women, up to 1 drink per day  For men, up to 2 drinks a day    No one should begin drinking or drink more frequently based on potential health benefits    Short-Term Health Risks:  Injuries: motor vehicle crashes, falls, drownings, burns  Violence: homicide, suicide, sexual assault, intimate partner violence  Alcohol poisoning  Reproductive health: risky sexual behaviors, unintended prengnacy, sexually transmitted diseases, miscarriage, stillbirth, fetal alcohol syndrome    Long-Term Health Risks:  Chronic diseases: high blood pressure, heart disease, stroke, liver disease, digestive problems  Cancers: breast, mouth and throat, liver, colon  Learning and memory problems: dementia, poor school performance  Mental health: depression, anxiety, insomnia  Social problems: lost productivity, family problems, unemployment  Alcohol dependence    For support and more information:  Substance Abuse and [facility] , [address] [place]  Web Address: https://[employer]/    Alcoholics Anonymous        Web Address: http://[employer]/    https://www cdc gov/alcohol/fact-sheets/alcohol-use htm     © Copyright [employer] 2018 Information is for End User's use only and may not be sold, redistributed or otherwise used for commercial purposes  All illustrations and images included in CareNotes® are the copyrighted property of A D A M , Inc  or [facility] wellness completed  Hypertension- At goal  Continue present medication  Hyperlipidemia- LDL elevated, but improved over the past year  Buckle down on diet and recheck in six months  Coronary calcium Score was zero  Prediabetes- A1c 5 7  Continue diet and lifestyle  Left knee pain- Much improved  Followed by Dr Andrew Rebolledo

## 2022-09-28 NOTE — PROGRESS NOTES
Assessment and Plan:     Problem List Items Addressed This Visit    None          Preventive health issues were discussed with patient, and age appropriate screening tests were ordered as noted in patient's After Visit Summary  Personalized health advice and appropriate referrals for health education or preventive services given if needed, as noted in patient's After Visit Summary  History of Present Illness:     Patient presents for a Medicare Wellness Visit    Medicare wellness today     Patient Care Team:  Steve Stoddard as PCP - General (Internal Medicine)     Review of Systems:     Review of Systems   Constitutional: Negative  Respiratory: Negative  Cardiovascular: Negative  Musculoskeletal: Negative  Psychiatric/Behavioral: Negative           Problem List:     Patient Active Problem List   Diagnosis    Benign positional vertigo    Chondromalacia patellae    Elevated HDL    Hay fever    Hyperlipidemia    Impaired fasting glucose    Osteoarthritis of knee    Osteopenia    Prediabetes    Varicose veins    Need for hepatitis C screening test    Need for shingles vaccine    Anxiety    Need for meningococcal vaccination    Encounter for screening mammogram for breast cancer    Cyst of right kidney 2 3 x 2 6 x 2 4 cm complex lesion right kidney MRI constrast benign    Medicare annual wellness visit, subsequent    Essential hypertension    Acute pain of left knee      Past Medical and Surgical History:     Past Medical History:   Diagnosis Date    Gallbladder disease      Past Surgical History:   Procedure Laterality Date    CERVICAL SPINE SURGERY      CHOLECYSTECTOMY  1987      Family History:     Family History   Problem Relation Age of Onset    Other Mother         Coronary Artery Surgery    Hyperlipidemia Mother     Hyperlipidemia Father     Other Sister         Hay fever    Osteoarthritis Family     Other Child         Hay fever      Social History:     Social History     Socioeconomic History    Marital status: /Civil Union     Spouse name: None    Number of children: None    Years of education: None    Highest education level: None   Occupational History    None   Tobacco Use    Smoking status: Former Smoker     Quit date: 1981     Years since quittin 4    Smokeless tobacco: Never Used    Tobacco comment: Former Smoker-Quit smoking    Substance and Sexual Activity    Alcohol use: No     Comment: Per allscripts social    Drug use: No    Sexual activity: Never   Other Topics Concern    None   Social History Narrative    Daily coffee consumption     Social Determinants of Health     Financial Resource Strain: Not on file   Food Insecurity: Not on file   Transportation Needs: Not on file   Physical Activity: Not on file   Stress: Not on file   Social Connections: Not on file   Intimate Partner Violence: Not on file   Housing Stability: Not on file      Medications and Allergies:     Current Outpatient Medications   Medication Sig Dispense Refill    acetaminophen (TYLENOL) 500 mg tablet Take 2 tablets by mouth 3 (three) times a day      amLODIPine (NORVASC) 2 5 mg tablet TAKE 1 TABLET BY MOUTH EVERY DAY 90 tablet 2    cholecalciferol (VITAMIN D3) 1,000 units tablet Take by mouth      Chromium 1 MG CAPS Take by mouth      Glucosamine-Chondroitin 750-600 MG TABS Take 1 tablet by mouth 2 (two) times a day      Lactobacillus Rhamnosus, GG, ([employer] PROBIOTIC, LACTOBACILLUS, PO) Take by mouth      Multiple Vitamins-Calcium (TGT DAILY MULTIVITAMIN WOMENS PO) Take by mouth      Omega-3 Fatty Acids (FISH OIL) 645 MG CAPS Take by mouth      TURMERIC PO Take by mouth       No current facility-administered medications for this visit       Allergies   Allergen Reactions    Other     Pollen Extract     Macrobid [Nitrofurantoin] Rash      Immunizations:     Immunization History   Administered Date(s) Administered    COVID-19 MODERNA VACC 0 25 ML IM BOOSTER 11/03/2021    INFLUENZA 12/11/2006, 10/30/2007, 10/21/2008, 10/20/2009, 10/13/2010, 10/16/2014, 10/02/2015, 10/15/2015, 09/07/2016, 11/06/2017, 12/01/2018, 12/31/2018, 10/21/2019    Influenza Split High Dose Preservative Free IM 09/07/2016    Influenza, high dose seasonal 0 7 mL 09/21/2020    Influenza, seasonal, injectable 10/02/2015    Meningococcal MCV4P 05/01/2019    Pneumococcal Conjugate 13-Valent 05/01/2018    Pneumococcal Polysaccharide PPV23 01/21/2020    Tdap 09/21/2020      Health Maintenance:         Topic Date Due    Breast Cancer Screening: Mammogram  01/24/2023    Colorectal Cancer Screening  04/10/2029    Hepatitis C Screening  Completed         Topic Date Due    COVID-19 Vaccine (1) 1955    Influenza Vaccine (1) 09/01/2022      Medicare Screening Tests and Risk Assessments:     Kimberlyn Pisano is here for her Subsequent Wellness visit  Health Risk Assessment:   Patient rates overall health as very good  Patient feels that their physical health rating is much better  Patient is very satisfied with their life  Eyesight was rated as same  Hearing was rated as same  Patient feels that their emotional and mental health rating is same  Patients states they are never, rarely angry  Patient states they are never, rarely unusually tired/fatigued  Pain experienced in the last 7 days has been none  Patient states that she has experienced no weight loss or gain in last 6 months  Fall Risk Screening: In the past year, patient has experienced: no history of falling in past year      Urinary Incontinence Screening:   Patient has not leaked urine accidently in the last six months  Home Safety:  Patient does not have trouble with stairs inside or outside of their home  Patient has working smoke alarms and has working carbon monoxide detector  Home safety hazards include: none  Nutrition:   Current diet is Limited junk food       Medications:   Patient is currently taking over-the-counter supplements  OTC medications include: Vit D, Glucosamine, tart cherry, multi vit  Patient is able to manage medications  Activities of Daily Living (ADLs)/Instrumental Activities of Daily Living (IADLs):   Walk and transfer into and out of bed and chair?: Yes  Dress and groom yourself?: Yes    Bathe or shower yourself?: Yes    Feed yourself? Yes  Do your laundry/housekeeping?: Yes  Manage your money, pay your bills and track your expenses?: Yes  Make your own meals?: Yes    Do your own shopping?: Yes    Previous Hospitalizations:   Any hospitalizations or ED visits within the last 12 months?: No      Advance Care Planning:   Living will: Yes    Durable POA for healthcare: Yes    Advanced directive: Yes      PREVENTIVE SCREENINGS      Cardiovascular Screening:    General: Screening Not Indicated and History Lipid Disorder      Diabetes Screening:     General: Screening Current      Colorectal Cancer Screening:     General: Screening Current      Breast Cancer Screening:     General: Screening Current      Cervical Cancer Screening:    General: Screening Not Indicated      Osteoporosis Screening:    General: Risks and Benefits Discussed      Lung Cancer Screening:     General: Screening Not Indicated      Hepatitis C Screening:    General: Screening Current    Screening, Brief Intervention, and Referral to Treatment (SBIRT)    Screening  Typical number of drinks in a day: 1  Typical number of drinks in a week: 3  Interpretation: Low risk drinking behavior  AUDIT-C Screenin) How often did you have a drink containing alcohol in the past year? 2 to 4 times a month  2) How many drinks did you have on a typical day when you were drinking in the past year?  1 to 2  3) How often did you have 6 or more drinks on one occasion in the past year? never    AUDIT-C Score: 2  Interpretation: Score 0-2 (female): Negative screen for alcohol misuse    Single Item Drug Screening:  How often have you used an illegal drug (including marijuana) or a prescription medication for non-medical reasons in the past year? never    Single Item Drug Screen Score: 0  Interpretation: Negative screen for possible drug use disorder    No exam data present     Physical Exam:     Pulse 98   Resp 16   Ht 5' 1" (1 549 m)   Wt 67 1 kg (148 lb)   SpO2 98%   BMI 27 96 kg/m²     Physical Exam  Vitals and nursing note reviewed  Constitutional:       General: She is not in acute distress  Appearance: She is well-developed  HENT:      Head: Normocephalic and atraumatic  Eyes:      Conjunctiva/sclera: Conjunctivae normal    Cardiovascular:      Rate and Rhythm: Normal rate and regular rhythm  Heart sounds: No murmur heard  Pulmonary:      Effort: Pulmonary effort is normal  No respiratory distress  Breath sounds: Normal breath sounds  Abdominal:      Palpations: Abdomen is soft  Tenderness: There is no abdominal tenderness  Musculoskeletal:      Cervical back: Neck supple  Skin:     General: Skin is warm and dry  Neurological:      Mental Status: She is alert            Avisa Carmel, [address]

## 2023-01-08 DIAGNOSIS — I10 HYPERTENSION, UNSPECIFIED TYPE: ICD-10-CM

## 2023-01-09 RX ORDER — AMLODIPINE BESYLATE 2.5 MG/1
TABLET ORAL
Qty: 90 TABLET | Refills: 0 | Status: SHIPPED | OUTPATIENT
Start: 2023-01-09

## 2023-01-26 ENCOUNTER — TELEPHONE (OUTPATIENT)
Dept: ADMINISTRATIVE | Facility: OTHER | Age: 68
End: 2023-01-26

## 2023-01-26 NOTE — TELEPHONE ENCOUNTER
Upon review of the In Basket request we were able to locate, review, and update the patient chart as requested for Mammogram     Any additional questions or concerns should be emailed to the Practice Liaisons via the appropriate education email address, please do not reply via In Basket      Thank you  Alayna Mart MA

## 2023-01-26 NOTE — TELEPHONE ENCOUNTER
----- Message from Gabbie Ambrosio, [address] [place] sent at 1/26/2023  7:35 AM EST -----  Regarding: mammo  01/26/23 7:35 AM    Hello, our patient attached above has had Mammogram completed/performed  Please assist in updating the patient chart by pulling the Care Everywhere (CE) document  The date of service is 1/25/23       Thank you,  Gabbie [facility] Fransisca Ayala

## 2023-01-31 DIAGNOSIS — Z12.31 ENCOUNTER FOR SCREENING MAMMOGRAM FOR BREAST CANCER: ICD-10-CM

## 2023-03-08 NOTE — PATIENT INSTRUCTIONS
[facility]  Pulmonary, Critical Care, and Sleep Medicine  Interdisciplinary and Ventilator Weaning Rounds    Patient discussed in morning walking rounds and interdisciplinary rounds. ICU admission day: 03/05/23    Overnight events:   Refractory hypoglycemia,   Stable BG overnight on D20 at 75/hr  Lowest 84  Myxedema without comma  Needed  glucose tabs overnight,  Taking PO diet  C peptide 0.9    Assessments and best practice:  Ventilator  Room air  Sedation  N/a  Other pertinent drips  D20 75/hr  Lines noted  PIV  Critical labs assessed  Yes  Antibiotics  N/a  Medications reviewed  Yes  Pending imaging  none  Pending send out labs  Yes  Pending Procedures  None  Glycemic control:  Steroids, D20 gtt  Stress ulcer prophylaxis. Protonix  DVT prophylaxis. SQH  Need for Lines, smith assessed.   Yes  Restraint Reevaluation     Not required      Family contact/MPOA:   Family updated n/a    Palliative consult within 3 days of admission to ICU-  Ethics Guidance: 21 days      Daily Plans:  Proinsulin level ordered  Endocrinology following        VIDYA time 5 minutes        Yomi Childs PA-C  03/08/23  Pulmonary, [address] [facility] [facility] [place] [facility] UTI- (+) urine dip in office, will send for culture  Start antibiotic, drink plenty of fluids  Please call with any concerns  Follow up with me as needed and with Dr Meléndez January as scheduled in May, labs prior  ambulatory

## 2023-05-05 ENCOUNTER — OFFICE VISIT (OUTPATIENT)
Dept: FAMILY MEDICINE CLINIC | Facility: CLINIC | Age: 68
End: 2023-05-05

## 2023-05-05 VITALS
SYSTOLIC BLOOD PRESSURE: 120 MMHG | WEIGHT: 147.4 LBS | HEART RATE: 86 BPM | TEMPERATURE: 96.5 F | BODY MASS INDEX: 27.83 KG/M2 | OXYGEN SATURATION: 99 % | DIASTOLIC BLOOD PRESSURE: 78 MMHG | HEIGHT: 61 IN

## 2023-05-05 DIAGNOSIS — I10 ESSENTIAL HYPERTENSION: ICD-10-CM

## 2023-05-05 DIAGNOSIS — Z76.89 ENCOUNTER TO ESTABLISH CARE: Primary | ICD-10-CM

## 2023-05-05 DIAGNOSIS — R73.03 PREDIABETES: ICD-10-CM

## 2023-05-05 DIAGNOSIS — E78.5 HYPERLIPIDEMIA, UNSPECIFIED HYPERLIPIDEMIA TYPE: ICD-10-CM

## 2023-05-05 PROBLEM — E78.89 ELEVATED HDL: Status: RESOLVED | Noted: 2017-04-18 | Resolved: 2023-05-05

## 2023-05-05 PROBLEM — M25.562 ACUTE PAIN OF LEFT KNEE: Status: RESOLVED | Noted: 2021-09-13 | Resolved: 2023-05-05

## 2023-05-05 PROBLEM — Z23 NEED FOR MENINGOCOCCAL VACCINATION: Status: RESOLVED | Noted: 2019-04-30 | Resolved: 2023-05-05

## 2023-05-05 PROBLEM — Z12.31 ENCOUNTER FOR SCREENING MAMMOGRAM FOR BREAST CANCER: Status: RESOLVED | Noted: 2019-07-01 | Resolved: 2023-05-05

## 2023-05-05 PROBLEM — Z11.59 NEED FOR HEPATITIS C SCREENING TEST: Status: RESOLVED | Noted: 2018-05-01 | Resolved: 2023-05-05

## 2023-05-05 PROBLEM — Z23 NEED FOR SHINGLES VACCINE: Status: RESOLVED | Noted: 2018-05-01 | Resolved: 2023-05-05

## 2023-05-05 PROBLEM — Z00.00 MEDICARE ANNUAL WELLNESS VISIT, SUBSEQUENT: Status: RESOLVED | Noted: 2020-09-21 | Resolved: 2023-05-05

## 2023-05-05 RX ORDER — AMLODIPINE BESYLATE 2.5 MG/1
2.5 TABLET ORAL DAILY
Qty: 90 TABLET | Refills: 1 | Status: SHIPPED | OUTPATIENT
Start: 2023-05-05

## 2023-05-05 NOTE — ASSESSMENT & PLAN NOTE
Continue to make healthy food choices and supplement with fish oil  Stay active    Obtain fasting blood work before appointment

## 2023-05-05 NOTE — ASSESSMENT & PLAN NOTE
Blood pressure well controlled  Refills sent to pharmacy  Discussed medication desired effects, potential side effects, and how to administer the medication  Non-pharmacological interventions suchas low salt, cardiac and Mediterranean diet discussed  Educated on stress reduction and physical activity  Encouraged to check blood pressure daily write down the numbers and bring them  to the next appointment  Discussed signs and symptoms of major cardiovascular event and need to present to the ED  Follow up in 4 months or sooner if needed  Patient verbalizes understanding regarding plan of care and all questions answered

## 2023-05-05 NOTE — ASSESSMENT & PLAN NOTE
Please obtain blood work before the next appointment  Continue to make healthy food choices and stay active

## 2023-05-05 NOTE — PROGRESS NOTES
BMI Counseling: Body mass index is 27 85 kg/m²  The BMI is above normal  Nutrition recommendations include encouraging healthy choices of fruits and vegetables, limiting drinks that contain sugar, moderation in carbohydrate intake, increasing intake of lean protein, reducing intake of saturated and trans fat and reducing intake of cholesterol  Exercise recommendations include strength training exercises  Rationale for BMI follow-up plan is due to patient being overweight or obese  Depression Screening and Follow-up Plan: Patient was screened for depression during today's encounter  They screened negative with a PHQ-2 score of 0  Assessment/Plan:         Problem List Items Addressed This Visit        Cardiovascular and Mediastinum    Essential hypertension     Blood pressure well controlled  Refills sent to pharmacy  Discussed medication desired effects, potential side effects, and how to administer the medication  Non-pharmacological interventions suchas low salt, cardiac and Mediterranean diet discussed  Educated on stress reduction and physical activity  Encouraged to check blood pressure daily write down the numbers and bring them  to the next appointment  Discussed signs and symptoms of major cardiovascular event and need to present to the ED  Follow up in 4 months or sooner if needed  Patient verbalizes understanding regarding plan of care and all questions answered  Relevant Medications    amLODIPine (NORVASC) 2 5 mg tablet    Other Relevant Orders    CBC and differential    Comprehensive metabolic panel       Other    Hyperlipidemia     Continue to make healthy food choices and supplement with fish oil  Stay active  Obtain fasting blood work before appointment         Relevant Orders    Lipid panel    Prediabetes     Please obtain blood work before the next appointment  Continue to make healthy food choices and stay active           Relevant Orders    Hemoglobin A1C   Other Visit Diagnoses     Encounter to establish care    -  Primary    Relevant Orders    CBC and differential    Comprehensive metabolic panel    Hemoglobin A1C    Lipid panel            Subjective:      Patient ID: Troy Buchanan is a 77 y o  female  Patient presents to the office in order to establish care  The following portions of the patient's history were reviewed and updated as appropriate:   Past Medical History:  She has a past medical history of Gallbladder disease ,  _______________________________________________________________________  Medical Problems:  does not have any pertinent problems on file ,  _______________________________________________________________________  Past Surgical History:   has a past surgical history that includes Cervical spine surgery and Cholecystectomy (1987)  ,  _______________________________________________________________________  Family History:  family history includes Hyperlipidemia in her father and mother; Osteoarthritis in her family; Other in her child, mother, and sister  ,  _______________________________________________________________________  Social History:   reports that she quit smoking about 42 years ago  Her smoking use included cigarettes  She has never used smokeless tobacco  She reports that she does not drink alcohol and does not use drugs  ,  _______________________________________________________________________  Allergies:  is allergic to other, pollen extract, and macrobid [nitrofurantoin]     _______________________________________________________________________  Current Outpatient Medications   Medication Sig Dispense Refill    acetaminophen (TYLENOL) 500 mg tablet Take 2 tablets by mouth 3 (three) times a day      amLODIPine (NORVASC) 2 5 mg tablet Take 1 tablet (2 5 mg total) by mouth daily 90 tablet 1    cholecalciferol (VITAMIN D3) 1,000 units tablet Take by mouth      Chromium 1 MG CAPS Take by mouth      Glucosamine-Chondroitin "750-600 MG TABS Take 1 tablet by mouth 2 (two) times a day      Lactobacillus Rhamnosus, GG, ([employer] PROBIOTIC, LACTOBACILLUS, PO) Take by mouth      Multiple Vitamins-Calcium (TGT DAILY MULTIVITAMIN WOMENS PO) Take by mouth      Omega-3 Fatty Acids (FISH OIL) 645 MG CAPS Take by mouth      TART CHERRY PO Take by mouth      TURMERIC PO Take by mouth       No current facility-administered medications for this visit      _______________________________________________________________________  Review of Systems   Constitutional: Negative for chills and fever  Respiratory: Negative for cough and shortness of breath  Cardiovascular: Negative for chest pain  Gastrointestinal: Negative for abdominal pain and vomiting  Genitourinary: Negative for dysuria  Musculoskeletal: Negative for arthralgias and back pain  Neurological: Negative for headaches  All other systems reviewed and are negative  Objective:  Vitals:    05/05/23 0729   BP: 120/78   BP Location: Left arm   Patient Position: Sitting   Cuff Size: Standard   Pulse: 86   Temp: (!) 96 5 °F (35 8 °C)   TempSrc: Tympanic   SpO2: 99%   Weight: 66 9 kg (147 lb 6 4 oz)   Height: 5' 1\" (1 549 m)     Body mass index is 27 85 kg/m²  Physical Exam  Vitals and nursing note reviewed  Constitutional:       General: She is not in acute distress  Appearance: Normal appearance  She is not ill-appearing  Cardiovascular:      Rate and Rhythm: Normal rate and regular rhythm  Heart sounds: Normal heart sounds  Pulmonary:      Effort: Pulmonary effort is normal       Breath sounds: Normal breath sounds  Musculoskeletal:         General: Normal range of motion  Skin:     General: Skin is warm and dry  Neurological:      Mental Status: She is alert and oriented to person, place, and time  Psychiatric:         Mood and Affect: Mood normal          Behavior: Behavior normal          Thought Content:  Thought content normal          Judgment: " Judgment normal

## 2023-07-24 ENCOUNTER — OFFICE VISIT (OUTPATIENT)
Dept: FAMILY MEDICINE CLINIC | Facility: CLINIC | Age: 68
End: 2023-07-24
Payer: MEDICARE

## 2023-07-24 ENCOUNTER — TELEPHONE (OUTPATIENT)
Dept: FAMILY MEDICINE CLINIC | Facility: CLINIC | Age: 68
End: 2023-07-24

## 2023-07-24 VITALS
HEART RATE: 85 BPM | HEIGHT: 61 IN | BODY MASS INDEX: 27.9 KG/M2 | SYSTOLIC BLOOD PRESSURE: 138 MMHG | DIASTOLIC BLOOD PRESSURE: 88 MMHG | OXYGEN SATURATION: 99 % | WEIGHT: 147.8 LBS

## 2023-07-24 DIAGNOSIS — I10 ESSENTIAL HYPERTENSION: ICD-10-CM

## 2023-07-24 DIAGNOSIS — N30.90 CYSTITIS: Primary | ICD-10-CM

## 2023-07-24 LAB
SL AMB  POCT GLUCOSE, UA: ABNORMAL
SL AMB LEUKOCYTE ESTERASE,UA: ABNORMAL
SL AMB POCT BILIRUBIN,UA: ABNORMAL
SL AMB POCT BLOOD,UA: ABNORMAL
SL AMB POCT CLARITY,UA: CLEAR
SL AMB POCT COLOR,UA: YELLOW
SL AMB POCT KETONES,UA: ABNORMAL
SL AMB POCT NITRITE,UA: ABNORMAL
SL AMB POCT PH,UA: 6
SL AMB POCT SPECIFIC GRAVITY,UA: 1.01
SL AMB POCT URINE PROTEIN: ABNORMAL
SL AMB POCT UROBILINOGEN: 3.5

## 2023-07-24 PROCEDURE — 87186 SC STD MICRODIL/AGAR DIL: CPT | Performed by: NURSE PRACTITIONER

## 2023-07-24 PROCEDURE — 87086 URINE CULTURE/COLONY COUNT: CPT | Performed by: NURSE PRACTITIONER

## 2023-07-24 PROCEDURE — 99214 OFFICE O/P EST MOD 30 MIN: CPT | Performed by: NURSE PRACTITIONER

## 2023-07-24 PROCEDURE — 81002 URINALYSIS NONAUTO W/O SCOPE: CPT | Performed by: NURSE PRACTITIONER

## 2023-07-24 PROCEDURE — 87077 CULTURE AEROBIC IDENTIFY: CPT | Performed by: NURSE PRACTITIONER

## 2023-07-24 RX ORDER — SULFAMETHOXAZOLE AND TRIMETHOPRIM 800; 160 MG/1; MG/1
1 TABLET ORAL EVERY 12 HOURS SCHEDULED
Qty: 6 TABLET | Refills: 0 | Status: SHIPPED | OUTPATIENT
Start: 2023-07-24 | End: 2023-07-27

## 2023-07-24 NOTE — TELEPHONE ENCOUNTER
Patient called advising she has a UTI. She stated she has had them many times in the past and knows the symptoms. Please advise patient if she needs to be seen, will send over lap slip for urine culture or call in script. Patient states she is allergic to Macrobid.    [facility]  In [facility]

## 2023-07-24 NOTE — ASSESSMENT & PLAN NOTE
To begin Bactrim DS every 12 hours x 3 days. Will send urine for culture and follow. Counseled on the importance of increasing water irritants and the avoidance of bladder irritants. Follow up if symptoms have no improvement in 72 hours or sooner if symptoms worsen.

## 2023-07-24 NOTE — PROGRESS NOTES
Assessment/Plan:    Essential hypertension  Blood pressure acceptable, to continue amlodipine 2.5 mg daily. Cystitis  To begin Bactrim DS every 12 hours x 3 days. Will send urine for culture and follow. Counseled on the importance of increasing water irritants and the avoidance of bladder irritants. Follow up if symptoms have no improvement in 72 hours or sooner if symptoms worsen. Diagnoses and all orders for this visit:    Cystitis  -     sulfamethoxazole-trimethoprim (BACTRIM DS) 800-160 mg per tablet; Take 1 tablet by mouth every 12 (twelve) hours for 3 days    Essential hypertension          Subjective:      Patient ID: Mariely Harrell is a 77 y.o. female. Sonja Cole presents reporting dysuria that started this morning. She is also having urinary frequency. Denies fever, chills flank pain or hematuria.        The following portions of the patient's history were reviewed and updated as appropriate:   She   Patient Active Problem List    Diagnosis Date Noted   • Essential hypertension 09/21/2020   • Cyst of right kidney 2.3 x 2.6 x 2.4 cm complex lesion right kidney MRI constrast benign 02/20/2020   • Anxiety 04/08/2019   • Cystitis 01/31/2019   • Benign positional vertigo 07/03/2017   • Prediabetes 09/29/2015   • Chondromalacia patellae 08/19/2014   • Hay fever 08/19/2014   • Hyperlipidemia 08/19/2014   • Osteoarthritis of knee 08/19/2014   • Osteopenia 08/19/2014   • Varicose veins 08/19/2014     Current Outpatient Medications   Medication Sig Dispense Refill   • acetaminophen (TYLENOL) 500 mg tablet Take 2 tablets by mouth 3 (three) times a day     • amLODIPine (NORVASC) 2.5 mg tablet Take 1 tablet (2.5 mg total) by mouth daily 90 tablet 1   • cholecalciferol (VITAMIN D3) 1,000 units tablet Take by mouth     • Chromium 1 MG CAPS Take by mouth     • Glucosamine-Chondroitin 750-600 MG TABS Take 1 tablet by mouth 2 (two) times a day     • Lactobacillus Rhamnosus, GG, ([employer] PROBIOTIC, LACTOBACILLUS, PO) Take by mouth     • Multiple Vitamins-Calcium (TGT DAILY MULTIVITAMIN WOMENS PO) Take by mouth     • Omega-3 Fatty Acids (FISH OIL) 645 MG CAPS Take by mouth     • sulfamethoxazole-trimethoprim (BACTRIM DS) 800-160 mg per tablet Take 1 tablet by mouth every 12 (twelve) hours for 3 days 6 tablet 0   • TART CHERRY PO Take by mouth     • TURMERIC PO Take by mouth       No current facility-administered medications for this visit. She is allergic to other, pollen extract, and macrobid [nitrofurantoin]. .    Review of Systems   Constitutional: Negative. Respiratory: Negative. Cardiovascular: Negative. Gastrointestinal: Negative. Genitourinary: Positive for dysuria and frequency. Neurological: Negative. Psychiatric/Behavioral: Negative. /88   Pulse 85   Ht 5' 1" (1.549 m)   Wt 67 kg (147 lb 12.8 oz)   SpO2 99%   BMI 27.93 kg/m²     Objective:     Physical Exam  Vitals and nursing note reviewed. Constitutional:       General: She is not in acute distress. Appearance: Normal appearance. She is well-developed. She is not ill-appearing, toxic-appearing or diaphoretic. HENT:      Head: Normocephalic and atraumatic. Eyes:      Conjunctiva/sclera: Conjunctivae normal.   Cardiovascular:      Rate and Rhythm: Normal rate and regular rhythm. Heart sounds: Normal heart sounds. No murmur heard. Pulmonary:      Effort: Pulmonary effort is normal. No respiratory distress. Breath sounds: Normal breath sounds. No wheezing or rales. Chest:      Chest wall: No tenderness. Abdominal:      General: Abdomen is flat. Bowel sounds are normal. There is no distension. Palpations: Abdomen is soft. There is no mass. Tenderness: There is no abdominal tenderness. There is no right CVA tenderness, left CVA tenderness, guarding or rebound. Hernia: No hernia is present. Musculoskeletal:      Cervical back: Neck supple.    Neurological:      General: No focal deficit present. Mental Status: She is alert and oriented to person, place, and time. Psychiatric:         Mood and Affect: Mood normal.         Behavior: Behavior normal.         Thought Content:  Thought content normal.         Judgment: Judgment normal.

## 2023-07-26 LAB — BACTERIA UR CULT: ABNORMAL

## 2023-09-28 ENCOUNTER — APPOINTMENT (OUTPATIENT)
Dept: LAB | Facility: HOSPITAL | Age: 68
End: 2023-09-28
Payer: MEDICARE

## 2023-09-28 DIAGNOSIS — I10 ESSENTIAL HYPERTENSION: ICD-10-CM

## 2023-09-28 DIAGNOSIS — R73.03 PREDIABETES: ICD-10-CM

## 2023-09-28 DIAGNOSIS — Z76.89 ENCOUNTER TO ESTABLISH CARE: ICD-10-CM

## 2023-09-28 DIAGNOSIS — E78.5 HYPERLIPIDEMIA, UNSPECIFIED HYPERLIPIDEMIA TYPE: ICD-10-CM

## 2023-09-28 LAB
ALBUMIN SERPL BCP-MCNC: 4.2 G/DL (ref 3.5–5)
ALP SERPL-CCNC: 66 U/L (ref 34–104)
ALT SERPL W P-5'-P-CCNC: 14 U/L (ref 7–52)
ANION GAP SERPL CALCULATED.3IONS-SCNC: 6 MMOL/L
AST SERPL W P-5'-P-CCNC: 17 U/L (ref 13–39)
BASOPHILS # BLD AUTO: 0.03 THOUSANDS/ÂΜL (ref 0–0.1)
BASOPHILS NFR BLD AUTO: 1 % (ref 0–1)
BILIRUB SERPL-MCNC: 0.6 MG/DL (ref 0.2–1)
BUN SERPL-MCNC: 18 MG/DL (ref 5–25)
CALCIUM SERPL-MCNC: 9.7 MG/DL (ref 8.4–10.2)
CHLORIDE SERPL-SCNC: 104 MMOL/L (ref 96–108)
CHOLEST SERPL-MCNC: 272 MG/DL
CO2 SERPL-SCNC: 29 MMOL/L (ref 21–32)
CREAT SERPL-MCNC: 0.83 MG/DL (ref 0.6–1.3)
EOSINOPHIL # BLD AUTO: 0.13 THOUSAND/ÂΜL (ref 0–0.61)
EOSINOPHIL NFR BLD AUTO: 2 % (ref 0–6)
ERYTHROCYTE [DISTWIDTH] IN BLOOD BY AUTOMATED COUNT: 13 % (ref 11.6–15.1)
EST. AVERAGE GLUCOSE BLD GHB EST-MCNC: 123 MG/DL
GFR SERPL CREATININE-BSD FRML MDRD: 71 ML/MIN/1.73SQ M
GLUCOSE P FAST SERPL-MCNC: 108 MG/DL (ref 65–99)
HBA1C MFR BLD: 5.9 %
HCT VFR BLD AUTO: 39.2 % (ref 34.8–46.1)
HDLC SERPL-MCNC: 58 MG/DL
HGB BLD-MCNC: 12.7 G/DL (ref 11.5–15.4)
IMM GRANULOCYTES # BLD AUTO: 0.01 THOUSAND/UL (ref 0–0.2)
IMM GRANULOCYTES NFR BLD AUTO: 0 % (ref 0–2)
LDLC SERPL CALC-MCNC: 192 MG/DL (ref 0–100)
LYMPHOCYTES # BLD AUTO: 2.52 THOUSANDS/ÂΜL (ref 0.6–4.47)
LYMPHOCYTES NFR BLD AUTO: 42 % (ref 14–44)
MCH RBC QN AUTO: 27.2 PG (ref 26.8–34.3)
MCHC RBC AUTO-ENTMCNC: 32.4 G/DL (ref 31.4–37.4)
MCV RBC AUTO: 84 FL (ref 82–98)
MONOCYTES # BLD AUTO: 0.33 THOUSAND/ÂΜL (ref 0.17–1.22)
MONOCYTES NFR BLD AUTO: 6 % (ref 4–12)
NEUTROPHILS # BLD AUTO: 2.96 THOUSANDS/ÂΜL (ref 1.85–7.62)
NEUTS SEG NFR BLD AUTO: 49 % (ref 43–75)
NONHDLC SERPL-MCNC: 214 MG/DL
NRBC BLD AUTO-RTO: 0 /100 WBCS
PLATELET # BLD AUTO: 198 THOUSANDS/UL (ref 149–390)
PMV BLD AUTO: 10.9 FL (ref 8.9–12.7)
POTASSIUM SERPL-SCNC: 3.9 MMOL/L (ref 3.5–5.3)
PROT SERPL-MCNC: 7.2 G/DL (ref 6.4–8.4)
RBC # BLD AUTO: 4.67 MILLION/UL (ref 3.81–5.12)
SODIUM SERPL-SCNC: 139 MMOL/L (ref 135–147)
TRIGL SERPL-MCNC: 112 MG/DL
WBC # BLD AUTO: 5.98 THOUSAND/UL (ref 4.31–10.16)

## 2023-09-28 PROCEDURE — 80061 LIPID PANEL: CPT

## 2023-09-28 PROCEDURE — 80053 COMPREHEN METABOLIC PANEL: CPT

## 2023-09-28 PROCEDURE — 36415 COLL VENOUS BLD VENIPUNCTURE: CPT

## 2023-09-28 PROCEDURE — 83036 HEMOGLOBIN GLYCOSYLATED A1C: CPT

## 2023-09-28 PROCEDURE — 85025 COMPLETE CBC W/AUTO DIFF WBC: CPT

## 2023-10-02 ENCOUNTER — OFFICE VISIT (OUTPATIENT)
Dept: FAMILY MEDICINE CLINIC | Facility: CLINIC | Age: 68
End: 2023-10-02
Payer: MEDICARE

## 2023-10-02 VITALS
DIASTOLIC BLOOD PRESSURE: 80 MMHG | HEIGHT: 61 IN | TEMPERATURE: 97 F | HEART RATE: 88 BPM | BODY MASS INDEX: 27.56 KG/M2 | OXYGEN SATURATION: 99 % | RESPIRATION RATE: 12 BRPM | WEIGHT: 146 LBS | SYSTOLIC BLOOD PRESSURE: 130 MMHG

## 2023-10-02 DIAGNOSIS — I10 ESSENTIAL HYPERTENSION: ICD-10-CM

## 2023-10-02 DIAGNOSIS — E78.5 HYPERLIPIDEMIA, UNSPECIFIED HYPERLIPIDEMIA TYPE: ICD-10-CM

## 2023-10-02 DIAGNOSIS — Z00.00 MEDICARE ANNUAL WELLNESS VISIT, SUBSEQUENT: Primary | ICD-10-CM

## 2023-10-02 DIAGNOSIS — R73.03 PREDIABETES: ICD-10-CM

## 2023-10-02 PROCEDURE — G0439 PPPS, SUBSEQ VISIT: HCPCS

## 2023-10-02 RX ORDER — AMLODIPINE BESYLATE 2.5 MG/1
2.5 TABLET ORAL DAILY
Qty: 90 TABLET | Refills: 2 | Status: SHIPPED | OUTPATIENT
Start: 2023-10-02

## 2023-10-02 NOTE — ASSESSMENT & PLAN NOTE
Blood pressure well controlled. Refills sent to pharmacy. Discussed medication desired effects, potential side effects, and how to administer the medication. Non-pharmacological interventions suchas low salt, cardiac and Mediterranean diet discussed. Educated on stress reduction and physical activity. Encouraged to check blood pressure daily write down the numbers and bring them  to the next appointment. Discussed signs and symptoms of major cardiovascular event and need to present to the ED. Patient verbalizes understanding regarding plan of care and all questions answered.

## 2023-10-02 NOTE — ASSESSMENT & PLAN NOTE
Recent blood work discussed. Patient does not want to take a statin medicine. She will increase fish oil to 2 g daily.

## 2023-10-02 NOTE — PROGRESS NOTES
Assessment and Plan:     Problem List Items Addressed This Visit        Cardiovascular and Mediastinum    Essential hypertension     Blood pressure well controlled. Refills sent to pharmacy. Discussed medication desired effects, potential side effects, and how to administer the medication. Non-pharmacological interventions suchas low salt, cardiac and Mediterranean diet discussed. Educated on stress reduction and physical activity. Encouraged to check blood pressure daily write down the numbers and bring them  to the next appointment. Discussed signs and symptoms of major cardiovascular event and need to present to the ED. Patient verbalizes understanding regarding plan of care and all questions answered. Relevant Medications    amLODIPine (NORVASC) 2.5 mg tablet       Other    Hyperlipidemia     Recent blood work discussed. Patient does not want to take a statin medicine. She will increase fish oil to 2 g daily. Prediabetes     Continue to make healthy food choices and stay active. Decrease added sugar and carbohydrates. Other Visit Diagnoses     Medicare annual wellness visit, subsequent    -  Primary          Depression Screening and Follow-up Plan: Patient was screened for depression during today's encounter. They screened negative with a PHQ-2 score of 0. Preventive health issues were discussed with patient, and age appropriate screening tests were ordered as noted in patient's After Visit Summary. Personalized health advice and appropriate referrals for health education or preventive services given if needed, as noted in patient's After Visit Summary. History of Present Illness:     Patient presents for a Medicare Wellness Visit    Patient presents to the office for Medicare wellness visit. Patient Care Team:  Hemalatha Johns as PCP - General (Nurse Practitioner)     Review of Systems:     Review of Systems   Constitutional: Negative for chills and fever. Respiratory: Negative for cough and shortness of breath. Cardiovascular: Negative for chest pain. Gastrointestinal: Negative for abdominal pain and vomiting. Genitourinary: Negative for dysuria. Musculoskeletal: Negative for arthralgias and back pain. Neurological: Negative for headaches. All other systems reviewed and are negative.        Problem List:     Patient Active Problem List   Diagnosis   • Benign positional vertigo   • Chondromalacia patellae   • Hay fever   • Hyperlipidemia   • Osteoarthritis of knee   • Osteopenia   • Prediabetes   • Varicose veins   • Cystitis   • Anxiety   • Cyst of right kidney 2.3 x 2.6 x 2.4 cm complex lesion right kidney MRI constrast benign   • Essential hypertension      Past Medical and Surgical History:     Past Medical History:   Diagnosis Date   • Gallbladder disease      Past Surgical History:   Procedure Laterality Date   • CERVICAL SPINE SURGERY     • CHOLECYSTECTOMY  24      Family History:     Family History   Problem Relation Age of Onset   • Other Mother         Coronary Artery Surgery   • Hyperlipidemia Mother    • Hyperlipidemia Father    • Other Sister         Hay fever   • Osteoarthritis Family    • Other Child         Hay fever      Social History:     Social History     Socioeconomic History   • Marital status: /Civil Union     Spouse name: None   • Number of children: None   • Years of education: None   • Highest education level: None   Occupational History   • None   Tobacco Use   • Smoking status: Former     Types: Cigarettes     Quit date: 1981     Years since quittin.4   • Smokeless tobacco: Never   • Tobacco comments:     Former Smoker-Quit smoking    Substance and Sexual Activity   • Alcohol use: No     Comment: Per [employer]scripts social   • Drug use: No   • Sexual activity: Never   Other Topics Concern   • None   Social History Narrative    Daily coffee consumption     Social Determinants of Health     Financial Resource Strain: Low Risk  (9/25/2023)    Overall Financial Resource Strain (CARDIA)    • Difficulty of Paying Living Expenses: Not hard at all   Food Insecurity: Not on file   Transportation Needs: No Transportation Needs (9/25/2023)    PRAPARE - Transportation    • Lack of Transportation (Medical): No    • Lack of Transportation (Non-Medical): No   Physical Activity: Not on file   Stress: Not on file   Social Connections: Not on file   Intimate Partner Violence: Not on file   Housing Stability: Not on file      Medications and Allergies:     Current Outpatient Medications   Medication Sig Dispense Refill   • amLODIPine (NORVASC) 2.5 mg tablet Take 1 tablet (2.5 mg total) by mouth daily 90 tablet 2   • cholecalciferol (VITAMIN D3) 1,000 units tablet Take by mouth     • Glucosamine-Chondroitin 750-600 MG TABS Take 1 tablet by mouth 2 (two) times a day     • Lactobacillus Rhamnosus, GG, ([employer] PROBIOTIC, LACTOBACILLUS, PO) Take by mouth     • Multiple Vitamins-Calcium (TGT DAILY MULTIVITAMIN WOMENS PO) Take by mouth     • Omega-3 Fatty Acids (FISH OIL) 645 MG CAPS Take by mouth     • TART CHERRY PO Take by mouth     • TURMERIC PO Take by mouth     • acetaminophen (TYLENOL) 500 mg tablet Take 2 tablets by mouth 3 (three) times a day     • Chromium 1 MG CAPS Take by mouth       No current facility-administered medications for this visit.      Allergies   Allergen Reactions   • Other    • Pollen Extract    • Macrobid [Nitrofurantoin] Rash      Immunizations:     Immunization History   Administered Date(s) Administered   • COVID-19 MODERNA VACC 0.25 ML IM BOOSTER 11/03/2021   • INFLUENZA 12/11/2006, 10/30/2007, 10/21/2008, 10/20/2009, 10/13/2010, 10/16/2014, 10/02/2015, 10/15/2015, 09/07/2016, 11/06/2017, 12/01/2018, 12/31/2018, 10/21/2019   • Influenza Split High Dose Preservative Free IM 09/07/2016   • Influenza, high dose seasonal 0.7 mL 09/21/2020   • Influenza, seasonal, injectable 10/02/2015   • Meningococcal MCV4P 05/01/2019 • Pneumococcal Conjugate 13-Valent 05/01/2018   • Pneumococcal Polysaccharide PPV23 01/21/2020   • Tdap 09/21/2020      Health Maintenance:         Topic Date Due   • Breast Cancer Screening: Mammogram  01/25/2024   • Colorectal Cancer Screening  04/10/2029   • Hepatitis C Screening  Completed         Topic Date Due   • COVID-19 Vaccine (1) 11/03/2021   • Influenza Vaccine (1) 09/01/2023      Medicare Screening Tests and Risk Assessments:     Idalia Palmer is here for her Subsequent Wellness visit. Last Medicare Wellness visit information reviewed, patient interviewed, no change since last AWV. Health Risk Assessment:   Patient rates overall health as very good. Patient feels that their physical health rating is slightly better. Patient is very satisfied with their life. Eyesight was rated as same. Hearing was rated as same. Patient feels that their emotional and mental health rating is same. Patients states they are never, rarely angry. Patient states they are never, rarely unusually tired/fatigued. Pain experienced in the last 7 days has been none. Patient states that she has experienced no weight loss or gain in last 6 months. Depression Screening:   PHQ-2 Score: 0      Fall Risk Screening: In the past year, patient has experienced: no history of falling in past year      Urinary Incontinence Screening:   Patient has not leaked urine accidently in the last six months. Home Safety:  Patient does not have trouble with stairs inside or outside of their home. Patient has working smoke alarms and has working carbon monoxide detector. Home safety hazards include: none. Nutrition:   Current diet is Regular. Medications:   Patient is currently taking over-the-counter supplements. OTC medications include: glucosamine, multi-vitamin, vitamin D3, probiotic, turmeric, fish oil, tart cherry. Patient is able to manage medications.      Activities of Daily Living (ADLs)/Instrumental Activities of Daily Living (IADLs):   Walk and transfer into and out of bed and chair?: Yes  Dress and groom yourself?: Yes    Bathe or shower yourself?: Yes    Feed yourself? Yes  Do your laundry/housekeeping?: Yes  Manage your money, pay your bills and track your expenses?: Yes  Make your own meals?: Yes    Do your own shopping?: Yes    Previous Hospitalizations:   Any hospitalizations or ED visits within the last 12 months?: No      Advance Care Planning:   Living will: Yes    Durable POA for healthcare: Yes    Advanced directive: Yes    Advanced directive counseling given: Yes      PREVENTIVE SCREENINGS      Cardiovascular Screening:    General: Screening Not Indicated and History Lipid Disorder      Diabetes Screening:     General: Screening Current      Colorectal Cancer Screening:     General: Screening Current      Breast Cancer Screening:     General: Screening Current      Cervical Cancer Screening:    General: Screening Not Indicated      Osteoporosis Screening:    General: Risks and Benefits Discussed      Lung Cancer Screening:     General: Screening Not Indicated      Hepatitis C Screening:    General: Screening Current    Screening, Brief Intervention, and Referral to Treatment (SBIRT)    Screening  Typical number of drinks in a day: 0  Typical number of drinks in a week: 2  Interpretation: Low risk drinking behavior. AUDIT-C Screenin) How often did you have a drink containing alcohol in the past year? 2 to 4 times a month  2) How many drinks did you have on a typical day when you were drinking in the past year?  1 to 2  3) How often did you have 6 or more drinks on one occasion in the past year? never    AUDIT-C Score: 2  Interpretation: Score 0-2 (female): Negative screen for alcohol misuse    Single Item Drug Screening:  How often have you used an illegal drug (including marijuana) or a prescription medication for non-medical reasons in the past year? never    Single Item Drug Screen Score: 0  Interpretation: Negative screen for possible drug use disorder    Other Counseling Topics:   Calcium and vitamin D intake and regular weightbearing exercise. No results found. Physical Exam:     /80 (BP Location: Left arm)   Pulse 88   Temp (!) 97 °F (36.1 °C)   Resp 12   Ht 5' 1" (1.549 m)   Wt 66.2 kg (146 lb)   SpO2 99%   BMI 27.59 kg/m²     Physical Exam  Vitals and nursing note reviewed. Constitutional:       General: She is not in acute distress. Appearance: Normal appearance. She is well-developed. She is not ill-appearing. HENT:      Right Ear: Tympanic membrane, ear canal and external ear normal.      Left Ear: Tympanic membrane, ear canal and external ear normal.   Eyes:      Pupils: Pupils are equal, round, and reactive to light. Cardiovascular:      Rate and Rhythm: Normal rate and regular rhythm. Heart sounds: Normal heart sounds. No murmur heard. Pulmonary:      Effort: Pulmonary effort is normal. No respiratory distress. Breath sounds: Normal breath sounds. Musculoskeletal:         General: No swelling or tenderness. Normal range of motion. Right lower leg: No edema. Left lower leg: No edema. Skin:     General: Skin is warm and dry. Capillary Refill: Capillary refill takes less than 2 seconds. Neurological:      Mental Status: She is alert and oriented to person, place, and time. Psychiatric:         Mood and Affect: Mood normal.         Behavior: Behavior normal.         Thought Content:  Thought content normal.         Judgment: Judgment normal.          OMID Moe

## 2023-10-02 NOTE — PATIENT INSTRUCTIONS
Medicare Preventive Visit Patient Instructions  Thank you for completing your Welcome to Medicare Visit or Medicare Annual Wellness Visit today. Your next wellness visit will be due in one year (10/2/2024). The screening/preventive services that you may require over the next 5-10 years are detailed below. Some tests may not apply to you based off risk factors and/or age. Screening tests ordered at today's visit but not completed yet may show as past due. Also, please note that scanned in results may not display below. Preventive Screenings:  Service Recommendations Previous Testing/Comments   Colorectal Cancer Screening  * Colonoscopy    * Fecal Occult Blood Test (FOBT)/Fecal Immunochemical Test (FIT)  * Fecal DNA/Cologuard Test  * Flexible Sigmoidoscopy Age: 43-73 years old   Colonoscopy: every 10 years (may be performed more frequently if at higher risk)  OR  FOBT/FIT: every 1 year  OR  Cologuard: every 3 years  OR  Sigmoidoscopy: every 5 years  Screening may be recommended earlier than age 39 if at higher risk for colorectal cancer. Also, an individualized decision between you and your healthcare provider will decide whether screening between the ages of 77-80 would be appropriate. Colonoscopy: 04/10/2019  FOBT/FIT: Not on file  Cologuard: Not on file  Sigmoidoscopy: Not on file    Screening Current     Breast Cancer Screening Age: 36 years old  Frequency: every 1-2 years  Not required if history of left and right mastectomy Mammogram: 01/25/2023    Screening Current   Cervical Cancer Screening Between the ages of 21-29, pap smear recommended once every 3 years. Between the ages of 32-69, can perform pap smear with HPV co-testing every 5 years.    Recommendations may differ for women with a history of total hysterectomy, cervical cancer, or abnormal pap smears in past. Pap Smear: Not on file    Screening Not Indicated   Hepatitis C Screening Once for adults born between 1945 and 1965  More frequently in patients at high risk for Hepatitis C Hep C Antibody: 05/08/2019    Screening Current   Diabetes Screening 1-2 times per year if you're at risk for diabetes or have pre-diabetes Fasting glucose: 108 mg/dL (9/28/2023)  A1C: 5.9 % (9/28/2023)  Screening Current   Cholesterol Screening Once every 5 years if you don't have a lipid disorder. May order more often based on risk factors. Lipid panel: 09/28/2023    Screening Not Indicated  History Lipid Disorder     Other Preventive Screenings Covered by Medicare:  1. Abdominal Aortic Aneurysm (AAA) Screening: covered once if your at risk. You're considered to be at risk if you have a family history of AAA. 2. Lung Cancer Screening: covers low dose CT scan once per year if you meet all of the following conditions: (1) Age 48-67; (2) No signs or symptoms of lung cancer; (3) Current smoker or have quit smoking within the last 15 years; (4) You have a tobacco smoking history of at least 20 pack years (packs per day multiplied by number of years you smoked); (5) You get a written order from a healthcare provider. 3. Glaucoma Screening: covered annually if you're considered high risk: (1) You have diabetes OR (2) Family history of glaucoma OR (3)  aged 48 and older OR (3)  American aged 72 and older  3. Osteoporosis Screening: covered every 2 years if you meet one of the following conditions: (1) You're estrogen deficient and at risk for osteoporosis based off medical history and other findings; (2) Have a vertebral abnormality; (3) On glucocorticoid therapy for more than 3 months; (4) Have primary hyperparathyroidism; (5) On osteoporosis medications and need to assess response to drug therapy. · Last bone density test (DXA Scan): 01/09/2020.  5. HIV Screening: covered annually if you're between the age of 15-65. Also covered annually if you are younger than 13 and older than 72 with risk factors for HIV infection.  For pregnant patients, it is covered up to 3 times per pregnancy. Immunizations:  Immunization Recommendations   Influenza Vaccine Annual influenza vaccination during flu season is recommended for all persons aged >= 6 months who do not have contraindications   Pneumococcal Vaccine   * Pneumococcal conjugate vaccine = PCV13 (Prevnar 13), PCV15 (Vaxneuvance), PCV20 (Prevnar 20)  * Pneumococcal polysaccharide vaccine = PPSV23 (Pneumovax) Adults 20-63 years old: 1-3 doses may be recommended based on certain risk factors  Adults 72 years old: 1-2 doses may be recommended based off what pneumonia vaccine you previously received   Hepatitis B Vaccine 3 dose series if at intermediate or high risk (ex: diabetes, end stage renal disease, liver disease)   Tetanus (Td) Vaccine - COST NOT COVERED BY MEDICARE PART B Following completion of primary series, a booster dose should be given every 10 years to maintain immunity against tetanus. Td may also be given as tetanus wound prophylaxis. Tdap Vaccine - COST NOT COVERED BY MEDICARE PART B Recommended at least once for all adults. For pregnant patients, recommended with each pregnancy. Shingles Vaccine (Shingrix) - COST NOT COVERED BY MEDICARE PART B  2 shot series recommended in those aged 48 and above     Health Maintenance Due:      Topic Date Due   • Breast Cancer Screening: Mammogram  01/25/2024   • Colorectal Cancer Screening  04/10/2029   • Hepatitis C Screening  Completed     Immunizations Due:      Topic Date Due   • COVID-19 Vaccine (1) 11/03/2021   • Influenza Vaccine (1) 09/01/2023     Advance Directives   What are advance directives? Advance directives are legal documents that state your wishes and plans for medical care. These plans are made ahead of time in case you lose your ability to make decisions for yourself. Advance directives can apply to any medical decision, such as the treatments you want, and if you want to donate organs. What are the types of advance directives?   There are many types of advance directives, and each state has rules about how to use them. You may choose a combination of any of the following:  · Living will: This is a written record of the treatment you want. You can also choose which treatments you do not want, which to limit, and which to stop at a certain time. This includes surgery, medicine, IV fluid, and tube feedings. · Durable power of  for healthcare [facility]): This is a written record that states who you want to make healthcare choices for you when you are unable to make them for yourself. This person, called a proxy, is usually a family member or a friend. You may choose more than 1 proxy. · Do not resuscitate (DNR) order:  A DNR order is used in case your heart stops beating or you stop breathing. It is a request not to have certain forms of treatment, such as CPR. A DNR order may be included in other types of advance directives. · Medical directive: This covers the care that you want if you are in a coma, near death, or unable to make decisions for yourself. You can list the treatments you want for each condition. Treatment may include pain medicine, surgery, blood transfusions, dialysis, IV or tube feedings, and a ventilator (breathing machine). · Values history: This document has questions about your views, beliefs, and how you feel and think about life. This information can help others choose the care that you would choose. Why are advance directives important? An advance directive helps you control your care. Although spoken wishes may be used, it is better to have your wishes written down. Spoken wishes can be misunderstood, or not followed. Treatments may be given even if you do not want them. An advance directive may make it easier for your family to make difficult choices about your care.    Weight Management   Why it is important to manage your weight:  Being overweight increases your risk of health conditions such as heart disease, high blood pressure, type 2 diabetes, and certain types of cancer. It can also increase your risk for osteoarthritis, sleep apnea, and other respiratory problems. Aim for a slow, steady weight loss. Even a small amount of weight loss can lower your risk of health problems. How to lose weight safely:  A safe and healthy way to lose weight is to eat fewer calories and get regular exercise. You can lose up about 1 pound a week by decreasing the number of calories you eat by 500 calories each day. Healthy meal plan for weight management:  A healthy meal plan includes a variety of foods, contains fewer calories, and helps you stay healthy. A healthy meal plan includes the following:  · Eat whole-grain foods more often. A healthy meal plan should contain fiber. Fiber is the part of grains, fruits, and vegetables that is not broken down by your body. Whole-grain foods are healthy and provide extra fiber in your diet. Some examples of whole-grain foods are whole-wheat breads and pastas, oatmeal, brown rice, and bulgur. · Eat a variety of vegetables every day. Include dark, leafy greens such as spinach, kale, ysabel greens, and mustard greens. Eat yellow and orange vegetables such as carrots, sweet potatoes, and winter squash. · Eat a variety of fruits every day. Choose fresh or canned fruit (canned in its own juice or light syrup) instead of juice. Fruit juice has very little or no fiber. · Eat low-fat dairy foods. Drink fat-free (skim) milk or 1% milk. Eat fat-free yogurt and low-fat cottage cheese. Try low-fat cheeses such as mozzarella and other reduced-fat cheeses. · Choose meat and other protein foods that are low in fat. Choose beans or other legumes such as split peas or lentils. Choose fish, skinless poultry (chicken or turkey), or lean cuts of red meat (beef or pork). Before you cook meat or poultry, cut off any visible fat. · Use less fat and oil. Try baking foods instead of frying them.  Add less fat, such as margarine, sour cream, regular salad dressing and mayonnaise to foods. Eat fewer high-fat foods. Some examples of high-fat foods include french fries, doughnuts, ice cream, and cakes. · Eat fewer sweets. Limit foods and drinks that are high in sugar. This includes candy, cookies, regular soda, and sweetened drinks. Exercise:  Exercise at least 30 minutes per day on most days of the week. Some examples of exercise include walking, biking, dancing, and swimming. You can also fit in more physical activity by taking the stairs instead of the elevator or parking farther away from stores. Ask your healthcare provider about the best exercise plan for you. © Copyright [employer] 2018 Information is for End User's use only and may not be sold, redistributed or otherwise used for commercial purposes.  All illustrations and images included in CareNotes® are the copyrighted property of [employer] or [address] [place] [address]

## 2024-04-04 ENCOUNTER — OFFICE VISIT (OUTPATIENT)
Dept: FAMILY MEDICINE CLINIC | Facility: CLINIC | Age: 69
End: 2024-04-04
Payer: MEDICARE

## 2024-04-04 VITALS
WEIGHT: 149.6 LBS | HEIGHT: 61 IN | RESPIRATION RATE: 17 BRPM | BODY MASS INDEX: 28.25 KG/M2 | HEART RATE: 87 BPM | OXYGEN SATURATION: 98 % | TEMPERATURE: 98 F | DIASTOLIC BLOOD PRESSURE: 80 MMHG | SYSTOLIC BLOOD PRESSURE: 136 MMHG

## 2024-04-04 DIAGNOSIS — R73.03 PREDIABETES: ICD-10-CM

## 2024-04-04 DIAGNOSIS — I10 ESSENTIAL HYPERTENSION: Primary | ICD-10-CM

## 2024-04-04 DIAGNOSIS — H81.13 BENIGN PAROXYSMAL POSITIONAL VERTIGO DUE TO BILATERAL VESTIBULAR DISORDER: ICD-10-CM

## 2024-04-04 PROCEDURE — G2211 COMPLEX E/M VISIT ADD ON: HCPCS | Performed by: NURSE PRACTITIONER

## 2024-04-04 PROCEDURE — 99214 OFFICE O/P EST MOD 30 MIN: CPT | Performed by: NURSE PRACTITIONER

## 2024-04-04 NOTE — PROGRESS NOTES
Assessment/Plan:    Essential hypertension  Blood pressure stable, to continue current antihypertensive regimen.  Counseled on the importance of maintaining a healthy weight and consuming a low-sodium diet.   To obtain labs in 6 months.  Follow up in 6 months or sooner if needed.     Benign positional vertigo  Stable.  Patient is aware of triggers. Take Meclizine as needed.        Diagnoses and all orders for this visit:    Essential hypertension  -     CBC and differential; Future  -     Comprehensive metabolic panel; Future  -     Hemoglobin A1C; Future  -     Lipid Panel with Direct LDL reflex; Future  -     TSH, 3rd generation with Free T4 reflex; Future  -     UA (URINE) with reflex to Scope; Future    Prediabetes  -     Hemoglobin A1C; Future    Benign paroxysmal positional vertigo due to bilateral vestibular disorder          Subjective:      Patient ID: Tianna Cadena is a 69 y.o. female.    Lyn presents for a follow-up.  She has been taking her amlodipine 2.5 mg daily.  She will occasionally check her blood pressure.  It ranges in the 120-130/70s-80s.  Denies chest pain, shortness of breath, headache, or lower extremity edema.  She does occasionally experience dizziness due to vertigo.  She is aware of her triggers and will take meclizine.  Otherwise, feeling well.        The following portions of the patient's history were reviewed and updated as appropriate: She   Patient Active Problem List    Diagnosis Date Noted    Essential hypertension 09/21/2020    Cyst of right kidney 2.3 x 2.6 x 2.4 cm complex lesion right kidney MRI constrast benign 02/20/2020    Anxiety 04/08/2019    Cystitis 01/31/2019    Benign positional vertigo 07/03/2017    Prediabetes 09/29/2015    Chondromalacia patellae 08/19/2014    Hay fever 08/19/2014    Hyperlipidemia 08/19/2014    Osteoarthritis of knee 08/19/2014    Osteopenia 08/19/2014    Varicose veins 08/19/2014     Current Outpatient Medications   Medication  "Sig Dispense Refill    acetaminophen (TYLENOL) 500 mg tablet Take 2 tablets by mouth 3 (three) times a day      amLODIPine (NORVASC) 2.5 mg tablet Take 1 tablet (2.5 mg total) by mouth daily 90 tablet 2    cholecalciferol (VITAMIN D3) 1,000 units tablet Take by mouth      Chromium 1 MG CAPS Take by mouth      Glucosamine-Chondroitin 750-600 MG TABS Take 1 tablet by mouth 2 (two) times a day      Lactobacillus Rhamnosus, GG, ([employer] PROBIOTIC, LACTOBACILLUS, PO) Take by mouth      Multiple Vitamins-Calcium (TGT DAILY MULTIVITAMIN WOMENS PO) Take by mouth      Omega-3 Fatty Acids (FISH OIL) 645 MG CAPS Take by mouth      TART CHERRY PO Take by mouth      TURMERIC PO Take by mouth       No current facility-administered medications for this visit.     She is allergic to other, pollen extract, and macrobid [nitrofurantoin]..    Review of Systems   Constitutional: Negative.    HENT: Negative.     Eyes: Negative.    Respiratory: Negative.     Cardiovascular: Negative.    Gastrointestinal: Negative.    Endocrine: Negative.    Genitourinary: Negative.    Musculoskeletal:  Positive for arthralgias and neck pain (chronic and intermittent).   Skin: Negative.    Allergic/Immunologic: Negative.    Neurological:  Positive for dizziness (occasional vertigo).   Hematological: Negative.    Psychiatric/Behavioral: Negative.           /80 (BP Location: Left arm, Patient Position: Sitting)   Pulse 87   Temp 98 °F (36.7 °C)   Resp 17   Ht 5' 1\" (1.549 m)   Wt 67.9 kg (149 lb 9.6 oz)   SpO2 98%   BMI 28.27 kg/m²     Objective:     Physical Exam  Vitals and nursing note reviewed.   Constitutional:       General: She is not in acute distress.     Appearance: Normal appearance. She is well-developed. She is not ill-appearing, toxic-appearing or diaphoretic.   HENT:      Head: Normocephalic and atraumatic.   Eyes:      Conjunctiva/sclera: Conjunctivae normal.   Cardiovascular:      Rate and Rhythm: Normal rate and regular rhythm.    "   Heart sounds: Normal heart sounds. No murmur heard.  Pulmonary:      Effort: Pulmonary effort is normal. No respiratory distress.      Breath sounds: Normal breath sounds. No wheezing or rales.   Chest:      Chest wall: No tenderness.   Musculoskeletal:      Cervical back: Neck supple.   Skin:     General: Skin is warm and dry.      Capillary Refill: Capillary refill takes less than 2 seconds.   Neurological:      General: No focal deficit present.      Mental Status: She is alert and oriented to person, place, and time.   Psychiatric:         Mood and Affect: Mood normal.         Behavior: Behavior normal.         Thought Content: Thought content normal.         Judgment: Judgment normal.

## 2024-04-04 NOTE — ASSESSMENT & PLAN NOTE
Blood pressure stable, to continue current antihypertensive regimen.  Counseled on the importance of maintaining a healthy weight and consuming a low-sodium diet.   To obtain labs in 6 months.  Follow up in 6 months or sooner if needed.

## 2024-08-06 DIAGNOSIS — I10 ESSENTIAL HYPERTENSION: ICD-10-CM

## 2024-08-06 RX ORDER — AMLODIPINE BESYLATE 2.5 MG/1
2.5 TABLET ORAL DAILY
Qty: 90 TABLET | Refills: 1 | Status: SHIPPED | OUTPATIENT
Start: 2024-08-06

## 2024-10-01 ENCOUNTER — RA CDI HCC (OUTPATIENT)
Dept: OTHER | Facility: HOSPITAL | Age: 69
End: 2024-10-01

## 2024-10-04 ENCOUNTER — APPOINTMENT (OUTPATIENT)
Dept: LAB | Facility: HOSPITAL | Age: 69
End: 2024-10-04
Payer: MEDICARE

## 2024-10-04 DIAGNOSIS — R73.03 PREDIABETES: ICD-10-CM

## 2024-10-04 DIAGNOSIS — I10 ESSENTIAL HYPERTENSION: ICD-10-CM

## 2024-10-04 LAB
ALBUMIN SERPL BCG-MCNC: 4.2 G/DL (ref 3.5–5)
ALP SERPL-CCNC: 67 U/L (ref 34–104)
ALT SERPL W P-5'-P-CCNC: 14 U/L (ref 7–52)
ANION GAP SERPL CALCULATED.3IONS-SCNC: 7 MMOL/L (ref 4–13)
AST SERPL W P-5'-P-CCNC: 18 U/L (ref 13–39)
BACTERIA UR QL AUTO: ABNORMAL /HPF
BASOPHILS # BLD AUTO: 0.04 THOUSANDS/ΜL (ref 0–0.1)
BASOPHILS NFR BLD AUTO: 1 % (ref 0–1)
BILIRUB SERPL-MCNC: 0.73 MG/DL (ref 0.2–1)
BILIRUB UR QL STRIP: NEGATIVE
BUN SERPL-MCNC: 15 MG/DL (ref 5–25)
CALCIUM SERPL-MCNC: 9.2 MG/DL (ref 8.4–10.2)
CHLORIDE SERPL-SCNC: 104 MMOL/L (ref 96–108)
CHOLEST SERPL-MCNC: 268 MG/DL
CLARITY UR: CLEAR
CO2 SERPL-SCNC: 28 MMOL/L (ref 21–32)
COLOR UR: YELLOW
CREAT SERPL-MCNC: 0.78 MG/DL (ref 0.6–1.3)
EOSINOPHIL # BLD AUTO: 0.19 THOUSAND/ΜL (ref 0–0.61)
EOSINOPHIL NFR BLD AUTO: 3 % (ref 0–6)
ERYTHROCYTE [DISTWIDTH] IN BLOOD BY AUTOMATED COUNT: 13 % (ref 11.6–15.1)
EST. AVERAGE GLUCOSE BLD GHB EST-MCNC: 120 MG/DL
GFR SERPL CREATININE-BSD FRML MDRD: 76 ML/MIN/1.73SQ M
GLUCOSE P FAST SERPL-MCNC: 107 MG/DL (ref 65–99)
GLUCOSE UR STRIP-MCNC: NEGATIVE MG/DL
HBA1C MFR BLD: 5.8 %
HCT VFR BLD AUTO: 41.4 % (ref 34.8–46.1)
HDLC SERPL-MCNC: 56 MG/DL
HGB BLD-MCNC: 13.2 G/DL (ref 11.5–15.4)
HGB UR QL STRIP.AUTO: NEGATIVE
IMM GRANULOCYTES # BLD AUTO: 0.01 THOUSAND/UL (ref 0–0.2)
IMM GRANULOCYTES NFR BLD AUTO: 0 % (ref 0–2)
KETONES UR STRIP-MCNC: NEGATIVE MG/DL
LDLC SERPL CALC-MCNC: 186 MG/DL (ref 0–100)
LEUKOCYTE ESTERASE UR QL STRIP: ABNORMAL
LYMPHOCYTES # BLD AUTO: 2.37 THOUSANDS/ΜL (ref 0.6–4.47)
LYMPHOCYTES NFR BLD AUTO: 37 % (ref 14–44)
MCH RBC QN AUTO: 27.2 PG (ref 26.8–34.3)
MCHC RBC AUTO-ENTMCNC: 31.9 G/DL (ref 31.4–37.4)
MCV RBC AUTO: 85 FL (ref 82–98)
MONOCYTES # BLD AUTO: 0.41 THOUSAND/ΜL (ref 0.17–1.22)
MONOCYTES NFR BLD AUTO: 6 % (ref 4–12)
MUCOUS THREADS UR QL AUTO: ABNORMAL
NEUTROPHILS # BLD AUTO: 3.44 THOUSANDS/ΜL (ref 1.85–7.62)
NEUTS SEG NFR BLD AUTO: 53 % (ref 43–75)
NITRITE UR QL STRIP: NEGATIVE
NON-SQ EPI CELLS URNS QL MICRO: ABNORMAL /HPF
NRBC BLD AUTO-RTO: 0 /100 WBCS
PH UR STRIP.AUTO: 6 [PH]
PLATELET # BLD AUTO: 230 THOUSANDS/UL (ref 149–390)
PMV BLD AUTO: 11.1 FL (ref 8.9–12.7)
POTASSIUM SERPL-SCNC: 4.1 MMOL/L (ref 3.5–5.3)
PROT SERPL-MCNC: 7.1 G/DL (ref 6.4–8.4)
PROT UR STRIP-MCNC: NEGATIVE MG/DL
RBC # BLD AUTO: 4.85 MILLION/UL (ref 3.81–5.12)
RBC #/AREA URNS AUTO: ABNORMAL /HPF
SODIUM SERPL-SCNC: 139 MMOL/L (ref 135–147)
SP GR UR STRIP.AUTO: 1.02 (ref 1–1.03)
TRIGL SERPL-MCNC: 130 MG/DL
TSH SERPL DL<=0.05 MIU/L-ACNC: 1.73 UIU/ML (ref 0.45–4.5)
UROBILINOGEN UR STRIP-ACNC: <2 MG/DL
WBC # BLD AUTO: 6.46 THOUSAND/UL (ref 4.31–10.16)
WBC #/AREA URNS AUTO: ABNORMAL /HPF

## 2024-10-04 PROCEDURE — 85025 COMPLETE CBC W/AUTO DIFF WBC: CPT

## 2024-10-04 PROCEDURE — 80061 LIPID PANEL: CPT

## 2024-10-04 PROCEDURE — 83036 HEMOGLOBIN GLYCOSYLATED A1C: CPT

## 2024-10-04 PROCEDURE — 84443 ASSAY THYROID STIM HORMONE: CPT

## 2024-10-04 PROCEDURE — 80053 COMPREHEN METABOLIC PANEL: CPT

## 2024-10-04 PROCEDURE — 36415 COLL VENOUS BLD VENIPUNCTURE: CPT

## 2024-10-04 PROCEDURE — 81001 URINALYSIS AUTO W/SCOPE: CPT

## 2024-10-07 ENCOUNTER — OFFICE VISIT (OUTPATIENT)
Dept: FAMILY MEDICINE CLINIC | Facility: CLINIC | Age: 69
End: 2024-10-07
Payer: MEDICARE

## 2024-10-07 VITALS
HEART RATE: 88 BPM | BODY MASS INDEX: 28.05 KG/M2 | OXYGEN SATURATION: 98 % | RESPIRATION RATE: 16 BRPM | TEMPERATURE: 98 F | DIASTOLIC BLOOD PRESSURE: 78 MMHG | HEIGHT: 61 IN | SYSTOLIC BLOOD PRESSURE: 136 MMHG | WEIGHT: 148.6 LBS

## 2024-10-07 DIAGNOSIS — E78.5 HYPERLIPIDEMIA, UNSPECIFIED HYPERLIPIDEMIA TYPE: ICD-10-CM

## 2024-10-07 DIAGNOSIS — R73.03 PREDIABETES: ICD-10-CM

## 2024-10-07 DIAGNOSIS — Z00.00 MEDICARE ANNUAL WELLNESS VISIT, SUBSEQUENT: Primary | ICD-10-CM

## 2024-10-07 DIAGNOSIS — I10 ESSENTIAL HYPERTENSION: ICD-10-CM

## 2024-10-07 PROCEDURE — G0439 PPPS, SUBSEQ VISIT: HCPCS | Performed by: NURSE PRACTITIONER

## 2024-10-07 PROCEDURE — 99214 OFFICE O/P EST MOD 30 MIN: CPT | Performed by: NURSE PRACTITIONER

## 2024-10-07 RX ORDER — ROSUVASTATIN CALCIUM 5 MG/1
5 TABLET, COATED ORAL DAILY
Qty: 30 TABLET | Refills: 2 | Status: SHIPPED | OUTPATIENT
Start: 2024-10-07

## 2024-10-07 NOTE — ASSESSMENT & PLAN NOTE
Lipids elevated.  ASCVD risk score is 16.8%.  To begin Crestor 5 mg.  Will repeat lipids in 3 months.  Counseled the importance of consuming a heart healthy diet and engaging in daily physical activity.  Orders:    rosuvastatin (CRESTOR) 5 mg tablet; Take 1 tablet (5 mg total) by mouth daily    Comprehensive metabolic panel; Future    Lipid Panel with Direct LDL reflex; Future

## 2024-10-07 NOTE — ASSESSMENT & PLAN NOTE
Blood pressure stable, to continue current antihypertensive regimen.  Counseled on the importance of maintaining a healthy weight and consuming a low-sodium diet.  To continue to monitor blood pressure while at home.  6 months or sooner if needed.

## 2024-10-07 NOTE — PATIENT INSTRUCTIONS
Medicare Preventive Visit Patient Instructions  Thank you for completing your Welcome to Medicare Visit or Medicare Annual Wellness Visit today. Your next wellness visit will be due in one year (10/8/2025).  The screening/preventive services that you may require over the next 5-10 years are detailed below. Some tests may not apply to you based off risk factors and/or age. Screening tests ordered at today's visit but not completed yet may show as past due. Also, please note that scanned in results may not display below.  Preventive Screenings:  Service Recommendations Previous Testing/Comments   Colorectal Cancer Screening  * Colonoscopy    * Fecal Occult Blood Test (FOBT)/Fecal Immunochemical Test (FIT)  * Fecal DNA/Cologuard Test  * Flexible Sigmoidoscopy Age: 45-75 years old   Colonoscopy: every 10 years (may be performed more frequently if at higher risk)  OR  FOBT/FIT: every 1 year  OR  Cologuard: every 3 years  OR  Sigmoidoscopy: every 5 years  Screening may be recommended earlier than age 45 if at higher risk for colorectal cancer. Also, an individualized decision between you and your healthcare provider will decide whether screening between the ages of 76-85 would be appropriate. Colonoscopy: 04/10/2019  FOBT/FIT: Not on file  Cologuard: Not on file  Sigmoidoscopy: Not on file    Screening Current     Breast Cancer Screening Age: 40+ years old  Frequency: every 1-2 years  Not required if history of left and right mastectomy Mammogram: 01/26/2024    Screening Current   Cervical Cancer Screening Between the ages of 21-29, pap smear recommended once every 3 years.   Between the ages of 30-65, can perform pap smear with HPV co-testing every 5 years.   Recommendations may differ for women with a history of total hysterectomy, cervical cancer, or abnormal pap smears in past. Pap Smear: Not on file    Screening Not Indicated   Hepatitis C Screening Once for adults born between 1945 and 1965  More frequently in  patients at high risk for Hepatitis C Hep C Antibody: 05/08/2019    Screening Current   Diabetes Screening 1-2 times per year if you're at risk for diabetes or have pre-diabetes Fasting glucose: 107 mg/dL (10/4/2024)  A1C: 5.8 % (10/4/2024)  Screening Current   Cholesterol Screening Once every 5 years if you don't have a lipid disorder. May order more often based on risk factors. Lipid panel: 10/04/2024    Screening Not Indicated  History Lipid Disorder     Other Preventive Screenings Covered by Medicare:  Abdominal Aortic Aneurysm (AAA) Screening: covered once if your at risk. You're considered to be at risk if you have a family history of AAA.  Lung Cancer Screening: covers low dose CT scan once per year if you meet all of the following conditions: (1) Age 55-77; (2) No signs or symptoms of lung cancer; (3) Current smoker or have quit smoking within the last 15 years; (4) You have a tobacco smoking history of at least 20 pack years (packs per day multiplied by number of years you smoked); (5) You get a written order from a healthcare provider.  Glaucoma Screening: covered annually if you're considered high risk: (1) You have diabetes OR (2) Family history of glaucoma OR (3)  aged 50 and older OR (4)  American aged 65 and older  Osteoporosis Screening: covered every 2 years if you meet one of the following conditions: (1) You're estrogen deficient and at risk for osteoporosis based off medical history and other findings; (2) Have a vertebral abnormality; (3) On glucocorticoid therapy for more than 3 months; (4) Have primary hyperparathyroidism; (5) On osteoporosis medications and need to assess response to drug therapy.   Last bone density test (DXA Scan): 01/09/2020.  HIV Screening: covered annually if you're between the age of 15-65. Also covered annually if you are younger than 15 and older than 65 with risk factors for HIV infection. For pregnant patients, it is covered up to 3 times per  pregnancy.    Immunizations:  Immunization Recommendations   Influenza Vaccine Annual influenza vaccination during flu season is recommended for all persons aged >= 6 months who do not have contraindications   Pneumococcal Vaccine   * Pneumococcal conjugate vaccine = PCV13 (Prevnar 13), PCV15 (Vaxneuvance), PCV20 (Prevnar 20)  * Pneumococcal polysaccharide vaccine = PPSV23 (Pneumovax) Adults 19-65 yo with certain risk factors or if 65+ yo  If never received any pneumonia vaccine: recommend Prevnar 20 (PCV20)  Give PCV20 if previously received 1 dose of PCV13 or PPSV23   Hepatitis B Vaccine 3 dose series if at intermediate or high risk (ex: diabetes, end stage renal disease, liver disease)   Respiratory syncytial virus (RSV) Vaccine - COVERED BY MEDICARE PART D  * RSVPreF3 (Arexvy) CDC recommends that adults 60 years of age and older may receive a single dose of RSV vaccine using shared clinical decision-making (SCDM)   Tetanus (Td) Vaccine - COST NOT COVERED BY MEDICARE PART B Following completion of primary series, a booster dose should be given every 10 years to maintain immunity against tetanus. Td may also be given as tetanus wound prophylaxis.   Tdap Vaccine - COST NOT COVERED BY MEDICARE PART B Recommended at least once for all adults. For pregnant patients, recommended with each pregnancy.   Shingles Vaccine (Shingrix) - COST NOT COVERED BY MEDICARE PART B  2 shot series recommended in those 19 years and older who have or will have weakened immune systems or those 50 years and older     Health Maintenance Due:      Topic Date Due   • Breast Cancer Screening: Mammogram  01/26/2025   • Colorectal Cancer Screening  04/10/2029   • Hepatitis C Screening  Completed     Immunizations Due:  There are no preventive care reminders to display for this patient.  Advance Directives   What are advance directives?  Advance directives are legal documents that state your wishes and plans for medical care. These plans are made  ahead of time in case you lose your ability to make decisions for yourself. Advance directives can apply to any medical decision, such as the treatments you want, and if you want to donate organs.   What are the types of advance directives?  There are many types of advance directives, and each state has rules about how to use them. You may choose a combination of any of the following:  Living will:  This is a written record of the treatment you want. You can also choose which treatments you do not want, which to limit, and which to stop at a certain time. This includes surgery, medicine, IV fluid, and tube feedings.   Durable power of  for healthcare (DPAHC):  This is a written record that states who you want to make healthcare choices for you when you are unable to make them for yourself. This person, called a proxy, is usually a family member or a friend. You may choose more than 1 proxy.  Do not resuscitate (DNR) order:  A DNR order is used in case your heart stops beating or you stop breathing. It is a request not to have certain forms of treatment, such as CPR. A DNR order may be included in other types of advance directives.  Medical directive:  This covers the care that you want if you are in a coma, near death, or unable to make decisions for yourself. You can list the treatments you want for each condition. Treatment may include pain medicine, surgery, blood transfusions, dialysis, IV or tube feedings, and a ventilator (breathing machine).  Values history:  This document has questions about your views, beliefs, and how you feel and think about life. This information can help others choose the care that you would choose.  Why are advance directives important?  An advance directive helps you control your care. Although spoken wishes may be used, it is better to have your wishes written down. Spoken wishes can be misunderstood, or not followed. Treatments may be given even if you do not want them. An  advance directive may make it easier for your family to make difficult choices about your care.   Weight Management   Why it is important to manage your weight:  Being overweight increases your risk of health conditions such as heart disease, high blood pressure, type 2 diabetes, and certain types of cancer. It can also increase your risk for osteoarthritis, sleep apnea, and other respiratory problems. Aim for a slow, steady weight loss. Even a small amount of weight loss can lower your risk of health problems.  How to lose weight safely:  A safe and healthy way to lose weight is to eat fewer calories and get regular exercise. You can lose up about 1 pound a week by decreasing the number of calories you eat by 500 calories each day.   Healthy meal plan for weight management:  A healthy meal plan includes a variety of foods, contains fewer calories, and helps you stay healthy. A healthy meal plan includes the following:  Eat whole-grain foods more often.  A healthy meal plan should contain fiber. Fiber is the part of grains, fruits, and vegetables that is not broken down by your body. Whole-grain foods are healthy and provide extra fiber in your diet. Some examples of whole-grain foods are whole-wheat breads and pastas, oatmeal, brown rice, and bulgur.  Eat a variety of vegetables every day.  Include dark, leafy greens such as spinach, kale, ysabel greens, and mustard greens. Eat yellow and orange vegetables such as carrots, sweet potatoes, and winter squash.   Eat a variety of fruits every day.  Choose fresh or canned fruit (canned in its own juice or light syrup) instead of juice. Fruit juice has very little or no fiber.  Eat low-fat dairy foods.  Drink fat-free (skim) milk or 1% milk. Eat fat-free yogurt and low-fat cottage cheese. Try low-fat cheeses such as mozzarella and other reduced-fat cheeses.  Choose meat and other protein foods that are low in fat.  Choose beans or other legumes such as split peas or  lentils. Choose fish, skinless poultry (chicken or turkey), or lean cuts of red meat (beef or pork). Before you cook meat or poultry, cut off any visible fat.   Use less fat and oil.  Try baking foods instead of frying them. Add less fat, such as margarine, sour cream, regular salad dressing and mayonnaise to foods. Eat fewer high-fat foods. Some examples of high-fat foods include french fries, doughnuts, ice cream, and cakes.  Eat fewer sweets.  Limit foods and drinks that are high in sugar. This includes candy, cookies, regular soda, and sweetened drinks.  Exercise:  Exercise at least 30 minutes per day on most days of the week. Some examples of exercise include walking, biking, dancing, and swimming. You can also fit in more physical activity by taking the stairs instead of the elevator or parking farther away from stores. Ask your healthcare provider about the best exercise plan for you.      © Copyright [employer] 2018 Information is for End User's use only and may not be sold, redistributed or otherwise used for commercial purposes. All illustrations and images included in CareNotes® are the copyrighted property of [employer] or [employer]

## 2024-10-07 NOTE — ASSESSMENT & PLAN NOTE
A1c 5.8%.  To continue low-carb diet and engaging in daily physical activity.  Will continue to monitor lipids.

## 2024-10-07 NOTE — PROGRESS NOTES
Ambulatory Visit  Name: Tianna Cadena      : 1955      MRN: [id number]  Encounter Provider: OMID Gloria  Encounter Date: 10/7/2024   Encounter department: [facility] [address]    Assessment & Plan  Medicare annual wellness visit, subsequent         Essential hypertension  Blood pressure stable, to continue current antihypertensive regimen.  Counseled on the importance of maintaining a healthy weight and consuming a low-sodium diet.  To continue to monitor blood pressure while at home.  6 months or sooner if needed.       Hyperlipidemia, unspecified hyperlipidemia type  Lipids elevated.  ASCVD risk score is 16.8%.  To begin Crestor 5 mg.  Will repeat lipids in 3 months.  Counseled the importance of consuming a heart healthy diet and engaging in daily physical activity.  Orders:    rosuvastatin (CRESTOR) 5 mg tablet; Take 1 tablet (5 mg total) by mouth daily    Comprehensive metabolic panel; Future    Lipid Panel with Direct LDL reflex; Future    Prediabetes  A1c 5.8%.  To continue low-carb diet and engaging in daily physical activity.  Will continue to monitor lipids.          Preventive health issues were discussed with patient, and age appropriate screening tests were ordered as noted in patient's After Visit Summary. Personalized health advice and appropriate referrals for health education or preventive services given if needed, as noted in patient's After Visit Summary.    History of Present Illness     Lyn presents for an AWV.  She is feeling well.  She recently obtained labs.  She checks her blood pressure at home and it ranges in the 130s/70s-80s.  Denies headache, chest pain, dizziness, or shortness of breath.       Patient Care Team:  OMID Gloria as PCP - General (Family Medicine)    Review of Systems   Constitutional: Negative.    HENT: Negative.     Eyes: Negative.    Respiratory: Negative.     Cardiovascular: Negative.     Gastrointestinal: Negative.    Endocrine: Negative.    Genitourinary: Negative.    Musculoskeletal: Negative.    Skin: Negative.    Allergic/Immunologic: Negative.    Neurological: Negative.    Hematological: Negative.    Psychiatric/Behavioral: Negative.       Medical History Reviewed by provider this encounter:  Tobacco  Allergies  Meds  Problems  Med Hx  Surg Hx  Fam Hx       Annual Wellness Visit Questionnaire   Tianna is here for her Subsequent Wellness visit.     Health Risk Assessment:   Patient rates overall health as very good. Patient feels that their physical health rating is slightly better. Patient is very satisfied with their life. Eyesight was rated as same. Hearing was rated as same. Patient feels that their emotional and mental health rating is same. Patients states they are never, rarely angry. Patient states they are never, rarely unusually tired/fatigued. Pain experienced in the last 7 days has been none. Patient states that she has experienced no weight loss or gain in last 6 months.     Depression Screening:   PHQ-2 Score: 0      Fall Risk Screening:   In the past year, patient has experienced: no history of falling in past year      Urinary Incontinence Screening:   Patient has not leaked urine accidently in the last six months.     Home Safety:  Patient does not have trouble with stairs inside or outside of their home. Patient has working smoke alarms and has working carbon monoxide detector. Home safety hazards include: none.     Nutrition:   Current diet is Regular.     Medications:   Patient is currently taking over-the-counter supplements. OTC medications include: see medication list. Patient is able to manage medications.     Activities of Daily Living (ADLs)/Instrumental Activities of Daily Living (IADLs):   Walk and transfer into and out of bed and chair?: Yes  Dress and groom yourself?: Yes    Bathe or shower yourself?: Yes    Feed yourself? Yes  Do your  laundry/housekeeping?: Yes  Manage your money, pay your bills and track your expenses?: Yes  Make your own meals?: Yes    Do your own shopping?: Yes    Previous Hospitalizations:   Any hospitalizations or ED visits within the last 12 months?: No      Advance Care Planning:   Living will: Yes    Durable POA for healthcare: Yes    Advanced directive: Yes    End of Life Decisions reviewed with patient: Yes    Provider agrees with end of life decisions: Yes      Cognitive Screening:   Provider or family/friend/caregiver concerned regarding cognition?: No    PREVENTIVE SCREENINGS      Cardiovascular Screening:    General: Screening Not Indicated and History Lipid Disorder      Diabetes Screening:     General: Screening Current      Colorectal Cancer Screening:     General: Screening Current      Breast Cancer Screening:     General: Screening Current      Cervical Cancer Screening:    General: Screening Not Indicated      Osteoporosis Screening:    General: Screening Not Indicated and History Osteoporosis      Lung Cancer Screening:     General: Screening Not Indicated      Hepatitis C Screening:    General: Screening Current    Screening, Brief Intervention, and Referral to Treatment (SBIRT)    Screening  Typical number of drinks in a day: 0  Typical number of drinks in a week: 2  Interpretation: Low risk drinking behavior.    AUDIT-C Screenin) How often did you have a drink containing alcohol in the past year? 2 to 4 times a month  2) How many drinks did you have on a typical day when you were drinking in the past year? 0  3) How often did you have 6 or more drinks on one occasion in the past year? never    AUDIT-C Score: 2  Interpretation: Score 0-2 (female): Negative screen for alcohol misuse    Single Item Drug Screening:  How often have you used an illegal drug (including marijuana) or a prescription medication for non-medical reasons in the past year? never    Single Item Drug Screen Score: 0  Interpretation:  "Negative screen for possible drug use disorder    Other Counseling Topics:   Car/seat belt/driving safety, skin self-exam, sunscreen and calcium and vitamin D intake and regular weightbearing exercise.     Social Determinants of Health     Financial Resource Strain: Low Risk  (9/25/2023)    Overall Financial Resource Strain (CARDIA)     Difficulty of Paying Living Expenses: Not hard at all   Food Insecurity: No Food Insecurity (10/7/2024)    Hunger Vital Sign     Worried About Running Out of Food in the Last Year: Never true     Ran Out of Food in the Last Year: Never true   Transportation Needs: No Transportation Needs (10/7/2024)    PRAPARE - Transportation     Lack of Transportation (Medical): No     Lack of Transportation (Non-Medical): No   Housing Stability: Unknown (10/7/2024)    Housing Stability Vital Sign     Unable to Pay for Housing in the Last Year: No     Homeless in the Last Year: No   Utilities: Not At Risk (10/7/2024)    [facility] Utilities     Threatened with loss of utilities: No     No results found.    Objective     /78   Pulse 88   Temp 98 °F (36.7 °C)   Resp 16   Ht 5' 1\" (1.549 m)   Wt 67.4 kg (148 lb 9.6 oz)   SpO2 98%   BMI 28.08 kg/m²     Physical Exam  Vitals and nursing note reviewed.   Constitutional:       General: She is not in acute distress.     Appearance: Normal appearance. She is well-developed. She is not ill-appearing, toxic-appearing or diaphoretic.   HENT:      Head: Normocephalic and atraumatic.      Right Ear: Tympanic membrane and external ear normal.      Left Ear: Tympanic membrane and external ear normal.      Nose: Nose normal.      Mouth/Throat:      Mouth: Mucous membranes are moist.      Pharynx: Oropharynx is clear. No oropharyngeal exudate.   Eyes:      Conjunctiva/sclera: Conjunctivae normal.      Pupils: Pupils are equal, round, and reactive to light.   Neck:      Thyroid: No thyromegaly.   Cardiovascular:      Rate and Rhythm: Normal rate and regular " rhythm.      Pulses: Normal pulses.      Heart sounds: Normal heart sounds. No murmur heard.  Pulmonary:      Effort: Pulmonary effort is normal. No respiratory distress.      Breath sounds: Normal breath sounds. No stridor. No wheezing or rales.   Chest:      Chest wall: No tenderness.   Abdominal:      General: Bowel sounds are normal. There is no distension.      Palpations: Abdomen is soft. There is no mass.      Tenderness: There is no abdominal tenderness. There is no guarding or rebound.      Hernia: No hernia is present.   Musculoskeletal:         General: Normal range of motion.      Cervical back: Normal range of motion and neck supple.   Lymphadenopathy:      Cervical: No cervical adenopathy.   Skin:     General: Skin is warm and dry.      Capillary Refill: Capillary refill takes less than 2 seconds.   Neurological:      General: No focal deficit present.      Mental Status: She is alert and oriented to person, place, and time.      Cranial Nerves: No cranial nerve deficit.      Gait: Gait normal.   Psychiatric:         Mood and Affect: Mood normal.         Behavior: Behavior normal.         Thought Content: Thought content normal.         Judgment: Judgment normal.       Administrative Statements   I have spent a total time of 41 minutes in caring for this patient on the day of the visit/encounter including Diagnostic results, Prognosis, Risks and benefits of tx options, Instructions for management, Patient and family education, Importance of tx compliance, Risk factor reductions, Impressions, Counseling / Coordination of care, Documenting in the medical record, Reviewing / ordering tests, medicine, procedures  , and Obtaining or reviewing history  .

## 2024-10-29 DIAGNOSIS — E78.5 HYPERLIPIDEMIA, UNSPECIFIED HYPERLIPIDEMIA TYPE: ICD-10-CM

## 2024-10-30 RX ORDER — ROSUVASTATIN CALCIUM 5 MG/1
5 TABLET, COATED ORAL DAILY
Qty: 90 TABLET | Refills: 1 | Status: SHIPPED | OUTPATIENT
Start: 2024-10-30

## 2024-11-06 PROBLEM — Z00.00 MEDICARE ANNUAL WELLNESS VISIT, SUBSEQUENT: Status: RESOLVED | Noted: 2020-09-21 | Resolved: 2024-11-06

## 2024-11-16 DIAGNOSIS — I10 ESSENTIAL HYPERTENSION: ICD-10-CM

## 2024-11-18 RX ORDER — AMLODIPINE BESYLATE 2.5 MG/1
2.5 TABLET ORAL DAILY
Qty: 90 TABLET | Refills: 1 | Status: SHIPPED | OUTPATIENT
Start: 2024-11-18

## 2025-04-01 ENCOUNTER — RA CDI HCC (OUTPATIENT)
Dept: OTHER | Facility: HOSPITAL | Age: 70
End: 2025-04-01

## 2025-04-03 ENCOUNTER — RESULTS FOLLOW-UP (OUTPATIENT)
Dept: FAMILY MEDICINE CLINIC | Facility: CLINIC | Age: 70
End: 2025-04-03

## 2025-04-03 ENCOUNTER — APPOINTMENT (OUTPATIENT)
Dept: LAB | Facility: HOSPITAL | Age: 70
End: 2025-04-03
Payer: COMMERCIAL

## 2025-04-03 DIAGNOSIS — E78.5 HYPERLIPIDEMIA, UNSPECIFIED HYPERLIPIDEMIA TYPE: ICD-10-CM

## 2025-04-03 LAB
ALBUMIN SERPL BCG-MCNC: 4.4 G/DL (ref 3.5–5)
ALP SERPL-CCNC: 65 U/L (ref 34–104)
ALT SERPL W P-5'-P-CCNC: 15 U/L (ref 7–52)
ANION GAP SERPL CALCULATED.3IONS-SCNC: 5 MMOL/L (ref 4–13)
AST SERPL W P-5'-P-CCNC: 19 U/L (ref 13–39)
BILIRUB SERPL-MCNC: 0.66 MG/DL (ref 0.2–1)
BUN SERPL-MCNC: 13 MG/DL (ref 5–25)
CALCIUM SERPL-MCNC: 9.6 MG/DL (ref 8.4–10.2)
CHLORIDE SERPL-SCNC: 106 MMOL/L (ref 96–108)
CHOLEST SERPL-MCNC: 179 MG/DL (ref ?–200)
CO2 SERPL-SCNC: 30 MMOL/L (ref 21–32)
CREAT SERPL-MCNC: 0.8 MG/DL (ref 0.6–1.3)
GFR SERPL CREATININE-BSD FRML MDRD: 73 ML/MIN/1.73SQ M
GLUCOSE P FAST SERPL-MCNC: 120 MG/DL (ref 65–99)
HDLC SERPL-MCNC: 66 MG/DL
LDLC SERPL CALC-MCNC: 94 MG/DL (ref 0–100)
POTASSIUM SERPL-SCNC: 4.1 MMOL/L (ref 3.5–5.3)
PROT SERPL-MCNC: 7.1 G/DL (ref 6.4–8.4)
SODIUM SERPL-SCNC: 141 MMOL/L (ref 135–147)
TRIGL SERPL-MCNC: 94 MG/DL (ref ?–150)

## 2025-04-03 PROCEDURE — 80053 COMPREHEN METABOLIC PANEL: CPT

## 2025-04-03 PROCEDURE — 36415 COLL VENOUS BLD VENIPUNCTURE: CPT

## 2025-04-03 PROCEDURE — 80061 LIPID PANEL: CPT

## 2025-04-07 ENCOUNTER — OFFICE VISIT (OUTPATIENT)
Dept: FAMILY MEDICINE CLINIC | Facility: CLINIC | Age: 70
End: 2025-04-07
Payer: COMMERCIAL

## 2025-04-07 VITALS
BODY MASS INDEX: 28.66 KG/M2 | TEMPERATURE: 98 F | HEIGHT: 61 IN | RESPIRATION RATE: 14 BRPM | WEIGHT: 151.8 LBS | HEART RATE: 78 BPM | SYSTOLIC BLOOD PRESSURE: 120 MMHG | OXYGEN SATURATION: 99 % | DIASTOLIC BLOOD PRESSURE: 82 MMHG

## 2025-04-07 DIAGNOSIS — I10 ESSENTIAL HYPERTENSION: Primary | ICD-10-CM

## 2025-04-07 DIAGNOSIS — R73.01 IMPAIRED FASTING BLOOD SUGAR: ICD-10-CM

## 2025-04-07 DIAGNOSIS — E78.5 HYPERLIPIDEMIA, UNSPECIFIED HYPERLIPIDEMIA TYPE: ICD-10-CM

## 2025-04-07 LAB — SL AMB POCT HEMOGLOBIN AIC: 5.6 (ref ?–6.5)

## 2025-04-07 PROCEDURE — 83036 HEMOGLOBIN GLYCOSYLATED A1C: CPT | Performed by: NURSE PRACTITIONER

## 2025-04-07 PROCEDURE — 99214 OFFICE O/P EST MOD 30 MIN: CPT | Performed by: NURSE PRACTITIONER

## 2025-04-07 NOTE — ASSESSMENT & PLAN NOTE
Reviewed elevated glucose, recent CMP.  Glucose = 120, A1c obtained while in office.  A1c = 5.6%.  Will continue to monitor.  Counseled on consuming a low-carb diet and continuing daily physical activity.  Orders:    POCT hemoglobin A1c

## 2025-04-07 NOTE — PROGRESS NOTES
"Name: Tianna Cadena      : 1955      MRN: [id number]  Encounter Provider: OMID Gloria  Encounter Date: 2025   Encounter department: [facility] [address]  :  Assessment & Plan  Essential hypertension  Blood pressure stable, to continue current antihypertensive regimen.  Counseled on the importance of maintaining a healthy weight and consuming a low-sodium diet.   Follow-up in 6 months or sooner if needed.       Hyperlipidemia, unspecified hyperlipidemia type  Reviewed lipid panel from 4/3/2025.  Lipids stable.  To continue Crestor 5 mg daily.  Counseled on the importance of consuming a heart healthy diet and engaging in daily physical activity.  Will continue to monitor lipids.       Impaired fasting blood sugar  Reviewed elevated glucose, recent CMP.  Glucose = 120, A1c obtained while in office.  A1c = 5.6%.  Will continue to monitor.  Counseled on consuming a low-carb diet and continuing daily physical activity.  Orders:    POCT hemoglobin A1c           History of Present Illness   Lyn presents for a follow-up to review recent labs.  Feeling well.  Denies concerns.      Review of Systems   Constitutional: Negative.    HENT: Negative.     Eyes: Negative.    Respiratory: Negative.     Cardiovascular: Negative.    Gastrointestinal: Negative.    Endocrine: Negative.    Genitourinary: Negative.    Musculoskeletal: Negative.    Skin: Negative.    Allergic/Immunologic: Negative.    Neurological: Negative.    Hematological: Negative.    Psychiatric/Behavioral: Negative.         Objective   /82 (BP Location: Left arm, Cuff Size: Standard)   Pulse 78   Temp 98 °F (36.7 °C)   Resp 14   Ht 5' 1\" (1.549 m)   Wt 68.9 kg (151 lb 12.8 oz)   SpO2 99%   BMI 28.68 kg/m²      Physical Exam  Vitals and nursing note reviewed.   Constitutional:       General: She is not in acute distress.     Appearance: Normal appearance. She is well-developed. She is " not ill-appearing, toxic-appearing or diaphoretic.   HENT:      Head: Normocephalic and atraumatic.   Eyes:      Conjunctiva/sclera: Conjunctivae normal.   Cardiovascular:      Rate and Rhythm: Normal rate and regular rhythm.      Heart sounds: Normal heart sounds. No murmur heard.  Pulmonary:      Effort: Pulmonary effort is normal. No respiratory distress.      Breath sounds: Normal breath sounds. No wheezing or rales.   Chest:      Chest wall: No tenderness.   Abdominal:      General: Bowel sounds are normal. There is no distension.      Palpations: Abdomen is soft. There is no mass.      Tenderness: There is no abdominal tenderness. There is no guarding or rebound.      Hernia: No hernia is present.   Musculoskeletal:      Cervical back: Neck supple.   Skin:     General: Skin is warm and dry.      Capillary Refill: Capillary refill takes less than 2 seconds.   Neurological:      General: No focal deficit present.      Mental Status: She is alert and oriented to person, place, and time.   Psychiatric:         Mood and Affect: Mood normal.         Behavior: Behavior normal.         Thought Content: Thought content normal.         Judgment: Judgment normal.

## 2025-04-07 NOTE — ASSESSMENT & PLAN NOTE
Blood pressure stable, to continue current antihypertensive regimen.  Counseled on the importance of maintaining a healthy weight and consuming a low-sodium diet.   Follow-up in 6 months or sooner if needed.

## 2025-04-22 DIAGNOSIS — E78.5 HYPERLIPIDEMIA, UNSPECIFIED HYPERLIPIDEMIA TYPE: ICD-10-CM

## 2025-04-23 RX ORDER — ROSUVASTATIN CALCIUM 5 MG/1
5 TABLET, COATED ORAL DAILY
Qty: 90 TABLET | Refills: 1 | Status: SHIPPED | OUTPATIENT
Start: 2025-04-23

## 2025-08-03 DIAGNOSIS — I10 ESSENTIAL HYPERTENSION: ICD-10-CM

## 2025-08-05 RX ORDER — AMLODIPINE BESYLATE 2.5 MG/1
2.5 TABLET ORAL DAILY
Qty: 90 TABLET | Refills: 1 | Status: SHIPPED | OUTPATIENT
Start: 2025-08-05